# Patient Record
Sex: MALE | Race: WHITE | Employment: OTHER | URBAN - METROPOLITAN AREA
[De-identification: names, ages, dates, MRNs, and addresses within clinical notes are randomized per-mention and may not be internally consistent; named-entity substitution may affect disease eponyms.]

---

## 2019-02-11 ENCOUNTER — HOSPITAL ENCOUNTER (OUTPATIENT)
Age: 39
Discharge: HOME | End: 2019-02-11
Payer: COMMERCIAL

## 2019-02-11 LAB
ALBUMIN SERPL-MCNC: 3.9 G/DL (ref 3.5–5.2)
ALP BLD-CCNC: 76 U/L (ref 40–129)
ALT SERPL-CCNC: 13 U/L (ref 0–40)
ANION GAP SERPL CALCULATED.3IONS-SCNC: 9 MMOL/L (ref 7–16)
AST SERPL-CCNC: 20 U/L (ref 0–39)
BASOPHILS ABSOLUTE: 0.05 E9/L (ref 0–0.2)
BASOPHILS RELATIVE PERCENT: 0.8 % (ref 0–2)
BILIRUB SERPL-MCNC: <0.2 MG/DL (ref 0–1.2)
BUN BLDV-MCNC: 17 MG/DL (ref 6–20)
CALCIUM SERPL-MCNC: 9.3 MG/DL (ref 8.6–10.2)
CHLORIDE BLD-SCNC: 103 MMOL/L (ref 98–107)
CO2: 30 MMOL/L (ref 22–29)
CREAT SERPL-MCNC: 0.8 MG/DL (ref 0.7–1.2)
EOSINOPHILS ABSOLUTE: 0.15 E9/L (ref 0.05–0.5)
EOSINOPHILS RELATIVE PERCENT: 2.4 % (ref 0–6)
GFR AFRICAN AMERICAN: >60
GFR NON-AFRICAN AMERICAN: >60 ML/MIN/1.73
GLUCOSE FASTING: 98 MG/DL (ref 74–99)
HCT VFR BLD CALC: 38.8 % (ref 37–54)
HEMOGLOBIN: 13 G/DL (ref 12.5–16.5)
IMMATURE GRANULOCYTES #: 0.02 E9/L
IMMATURE GRANULOCYTES %: 0.3 % (ref 0–5)
LYMPHOCYTES ABSOLUTE: 2.3 E9/L (ref 1.5–4)
LYMPHOCYTES RELATIVE PERCENT: 36.5 % (ref 20–42)
MCH RBC QN AUTO: 30 PG (ref 26–35)
MCHC RBC AUTO-ENTMCNC: 33.5 % (ref 32–34.5)
MCV RBC AUTO: 89.4 FL (ref 80–99.9)
MONOCYTES ABSOLUTE: 0.32 E9/L (ref 0.1–0.95)
MONOCYTES RELATIVE PERCENT: 5.1 % (ref 2–12)
NEUTROPHILS ABSOLUTE: 3.46 E9/L (ref 1.8–7.3)
NEUTROPHILS RELATIVE PERCENT: 54.9 % (ref 43–80)
PDW BLD-RTO: 12.9 FL (ref 11.5–15)
PLATELET # BLD: 240 E9/L (ref 130–450)
PMV BLD AUTO: 9.9 FL (ref 7–12)
POTASSIUM SERPL-SCNC: 4.4 MMOL/L (ref 3.5–5)
RBC # BLD: 4.34 E12/L (ref 3.8–5.8)
SODIUM BLD-SCNC: 142 MMOL/L (ref 132–146)
TOTAL PROTEIN: 6.7 G/DL (ref 6.4–8.3)
WBC # BLD: 6.3 E9/L (ref 4.5–11.5)

## 2019-02-11 PROCEDURE — 80053 COMPREHEN METABOLIC PANEL: CPT

## 2019-02-11 PROCEDURE — 86592 SYPHILIS TEST NON-TREP QUAL: CPT

## 2019-02-11 PROCEDURE — 86703 HIV-1/HIV-2 1 RESULT ANTBDY: CPT

## 2019-02-11 PROCEDURE — 80074 ACUTE HEPATITIS PANEL: CPT

## 2019-02-11 PROCEDURE — 85025 COMPLETE CBC W/AUTO DIFF WBC: CPT

## 2019-02-11 PROCEDURE — 36415 COLL VENOUS BLD VENIPUNCTURE: CPT

## 2019-02-12 LAB
HIV-1 AND HIV-2 ANTIBODIES: NORMAL
RPR: NORMAL

## 2019-02-14 LAB
HAV IGM SER IA-ACNC: ABNORMAL
HEPATITIS B CORE IGM ANTIBODY: ABNORMAL
HEPATITIS B SURFACE ANTIGEN INTERPRETATION: ABNORMAL
HEPATITIS C ANTIBODY INTERPRETATION: REACTIVE

## 2020-12-26 ENCOUNTER — HOSPITAL ENCOUNTER (EMERGENCY)
Age: 40
Discharge: HOME | End: 2020-12-26
Payer: COMMERCIAL

## 2020-12-26 VITALS
BODY MASS INDEX: 21.22 KG/M2 | SYSTOLIC BLOOD PRESSURE: 134 MMHG | HEART RATE: 108 BPM | RESPIRATION RATE: 18 BRPM | WEIGHT: 140 LBS | HEIGHT: 68 IN | TEMPERATURE: 98.5 F | DIASTOLIC BLOOD PRESSURE: 84 MMHG | OXYGEN SATURATION: 99 %

## 2020-12-26 PROCEDURE — 99284 EMERGENCY DEPT VISIT MOD MDM: CPT

## 2020-12-26 RX ORDER — IBUPROFEN 600 MG/1
600 TABLET ORAL EVERY 6 HOURS PRN
Qty: 20 TABLET | Refills: 0 | Status: SHIPPED | OUTPATIENT
Start: 2020-12-26 | End: 2020-12-31

## 2020-12-26 RX ORDER — PENICILLIN V POTASSIUM 500 MG/1
500 TABLET ORAL 4 TIMES DAILY
Qty: 40 TABLET | Refills: 0 | Status: SHIPPED | OUTPATIENT
Start: 2020-12-26 | End: 2021-01-05

## 2020-12-26 RX ORDER — LIDOCAINE HYDROCHLORIDE 20 MG/ML
5 SOLUTION OROPHARYNGEAL 3 TIMES DAILY PRN
Qty: 75 ML | Refills: 0 | Status: SHIPPED | OUTPATIENT
Start: 2020-12-26

## 2020-12-26 ASSESSMENT — PAIN DESCRIPTION - ORIENTATION: ORIENTATION: RIGHT;LEFT

## 2020-12-26 ASSESSMENT — PAIN DESCRIPTION - LOCATION: LOCATION: MOUTH

## 2020-12-26 ASSESSMENT — PAIN DESCRIPTION - PAIN TYPE: TYPE: ACUTE PAIN

## 2020-12-26 ASSESSMENT — PAIN SCALES - GENERAL: PAINLEVEL_OUTOF10: 10

## 2020-12-26 ASSESSMENT — PAIN DESCRIPTION - DESCRIPTORS: DESCRIPTORS: ACHING;CONSTANT;DISCOMFORT

## 2020-12-26 NOTE — ED NOTES
Patient upset that \" I am not getting any pain medication\" Rn informed patient no orders were placed and that this is a prescription and Eliana ARCINIEGA is aware. Patient stormed off.      Marino Dickerson RN  12/26/20 9252

## 2020-12-26 NOTE — ED PROVIDER NOTES
[facility]   ED  Encounter Note  Admit Date/RoomTime: 2020  2:45 PM  ED Room:     NAME: Kai Araujo  : 1980  MRN: [id number]     Chief Complaint:  Dental Pain (10/10. started 2 days ago. getting worst. feels face is swollen. )    History of Present Illness        Kai Araujo is a 41 y.o. old male who presents to the emergency department with a complaint of dental pain. Patient notes he is very poor dentition overall. Admits he just had a infection a couple months ago. Pain started again about 4 days ago. Mostly to left lower jaw. Woke up this morning and he had some swelling along the jawline. Taking Motrin at home with no relief. Applying Orajel without relief. Eating makes it worse. Onset:       Spontaneous:   no.     Following Trauma:   no.     Previous Caries:   yes. Recent Dental Procedure:   no.     ROS   Pertinent positives and negatives are stated within HPI, all other systems reviewed and are negative. Past Medical History:  has a past medical history of Drug abuse ([address] Hepatitis C, and Scoliosis. Surgical History:  has a past surgical history that includes fasciotomy (2012). Social History:  reports that he has been smoking. He has a 17.00 pack-year smoking history. He has never used smokeless tobacco. He reports current drug use. Drugs: IV and Marijuana. He reports that he does not drink alcohol. Family History: family history is not on file. Allergies: Patient has no known allergies. Physical Exam   Oxygen Saturation Interpretation: Normal.        ED Triage Vitals [20 1447]   BP Temp Temp src Pulse Resp SpO2 Height Weight   134/84 -- -- 108 18 99 % 5' 8\" (1.727 m) 140 lb (63.5 kg)              General:  NAD. Alert and Oriented. Well-appearing. Skin:  Warm, dry. No rashes. Head:  Normocephalic. Atraumatic. Eyes:  EOMI.   Conjunctiva normal.  ENT:  Oral mucosa moist.  Airway patent. Posterior pharynx without erythema, without swelling, without exudate. With white film all over the tongue. Horrible dentition-teeth are carious, brown, grayish in color. Several are eroded down to the gumline. Area of pain is to the left lower jaw along teeth 22 through 19. No visible dental abscess. Neck:  Supple. Normal ROM. No tender lymphadenopathy. Respiratory:  No respiratory distress. No labored breathing. Lungs clear without rales, rhonchi or wheezing. Cardiovascular:  Regular rate. No peripheral edema. Extremities warm and good color. Extremities:  Normal ROM. Nontender to palpation. Atraumatic. Back:  Normal ROM. Nontender to palpation. Neuro:  Alert and Oriented to person, place, time and situation. Normal LOC. Moves all extremities. Speech fluent. Psych:  Calm and Cooperative. Normal thought process. Normal judgement. Lab / Imaging Results   (All laboratory and radiology results have been personally reviewed by myself)  Labs:  No results found for this visit on 12/26/20. Imaging: All Radiology results interpreted by Radiologist unless otherwise noted. No orders to display     ED Course / Medical Decision Making   Medications - No data to display     Consult(s):   Dental Resident was not consulted to see patient regarding complaint. Procedure(s):    none. Counseling/MDM:    I discussed todays visit summary with patient,  in addition to providing specific details for the plan of care and counseling regarding the diagnosis and prognosis. Questions are answered at this time and they are agreeable with the plan. Assessment      1. Pain, dental    2. Dental caries      Plan   Discharge to home.   Patient condition is good    New Medications     New Prescriptions    IBUPROFEN (IBU) 600 MG TABLET    Take 1 tablet by mouth every 6 hours as needed for Pain    LIDOCAINE VISCOUS HCL (XYLOCAINE) 2 % SOLN SOLUTION    Take 5 mLs by mouth 3 times daily as needed for Irritation Soak 5 mL's of viscous lidocaine with a cotton ball. Tuck the cotton ball along the side of the gums next to area of dental pain. PENICILLIN V POTASSIUM (VEETID) 500 MG TABLET    Take 1 tablet by mouth 4 times daily for 10 days     Electronically signed by SUZE Aburto   DD: 12/26/20  **This report was transcribed using voice recognition software. Every effort was made to ensure accuracy; however, inadvertent computerized transcription errors may be present.   END OF ED PROVIDER NOTE       Sharonda Aburto  12/26/20 [id number]

## 2023-10-13 ENCOUNTER — APPOINTMENT (OUTPATIENT)
Dept: ULTRASOUND IMAGING | Age: 43
End: 2023-10-13
Payer: COMMERCIAL

## 2023-10-13 ENCOUNTER — APPOINTMENT (OUTPATIENT)
Dept: GENERAL RADIOLOGY | Age: 43
End: 2023-10-13
Payer: COMMERCIAL

## 2023-10-13 ENCOUNTER — HOSPITAL ENCOUNTER (EMERGENCY)
Age: 43
Discharge: HOME | End: 2023-10-14
Attending: STUDENT IN AN ORGANIZED HEALTH CARE EDUCATION/TRAINING PROGRAM
Payer: COMMERCIAL

## 2023-10-13 DIAGNOSIS — L03.115 CELLULITIS OF RIGHT LOWER EXTREMITY: Primary | ICD-10-CM

## 2023-10-13 LAB
ALBUMIN SERPL-MCNC: 3.5 G/DL (ref 3.5–5.2)
ALP SERPL-CCNC: 114 U/L (ref 40–129)
ALT SERPL-CCNC: 27 U/L (ref 0–40)
ANION GAP SERPL CALCULATED.3IONS-SCNC: 6 MMOL/L (ref 7–16)
AST SERPL-CCNC: 23 U/L (ref 0–39)
BASOPHILS # BLD: 0.02 K/UL (ref 0–0.2)
BASOPHILS NFR BLD: 0 % (ref 0–2)
BILIRUB SERPL-MCNC: 0.2 MG/DL (ref 0–1.2)
BUN SERPL-MCNC: 17 MG/DL (ref 6–20)
CALCIUM SERPL-MCNC: 9.2 MG/DL (ref 8.6–10.2)
CHLORIDE SERPL-SCNC: 98 MMOL/L (ref 98–107)
CO2 SERPL-SCNC: 30 MMOL/L (ref 22–29)
CREAT SERPL-MCNC: 0.7 MG/DL (ref 0.7–1.2)
CRP SERPL HS-MCNC: 3 MG/L (ref 0–5)
EOSINOPHIL # BLD: 0.09 K/UL (ref 0.05–0.5)
EOSINOPHILS RELATIVE PERCENT: 1 % (ref 0–6)
ERYTHROCYTE [DISTWIDTH] IN BLOOD BY AUTOMATED COUNT: 13.7 % (ref 11.5–15)
ERYTHROCYTE [SEDIMENTATION RATE] IN BLOOD BY WESTERGREN METHOD: 29 MM/HR (ref 0–15)
GFR SERPL CREATININE-BSD FRML MDRD: >60 ML/MIN/1.73M2
GLUCOSE SERPL-MCNC: 89 MG/DL (ref 74–99)
HCT VFR BLD AUTO: 37.2 % (ref 37–54)
HGB BLD-MCNC: 12.2 G/DL (ref 12.5–16.5)
IMM GRANULOCYTES # BLD AUTO: 0.03 K/UL (ref 0–0.58)
IMM GRANULOCYTES NFR BLD: 0 % (ref 0–5)
LACTATE BLDV-SCNC: 0.8 MMOL/L (ref 0.5–1.9)
LYMPHOCYTES NFR BLD: 2.11 K/UL (ref 1.5–4)
LYMPHOCYTES RELATIVE PERCENT: 27 % (ref 20–42)
MCH RBC QN AUTO: 30.8 PG (ref 26–35)
MCHC RBC AUTO-ENTMCNC: 32.8 G/DL (ref 32–34.5)
MCV RBC AUTO: 93.9 FL (ref 80–99.9)
MONOCYTES NFR BLD: 0.5 K/UL (ref 0.1–0.95)
MONOCYTES NFR BLD: 6 % (ref 2–12)
NEUTROPHILS NFR BLD: 65 % (ref 43–80)
NEUTS SEG NFR BLD: 5.12 K/UL (ref 1.8–7.3)
PLATELET # BLD AUTO: 218 K/UL (ref 130–450)
PMV BLD AUTO: 9.6 FL (ref 7–12)
POTASSIUM SERPL-SCNC: 4.3 MMOL/L (ref 3.5–5)
PROT SERPL-MCNC: 6.5 G/DL (ref 6.4–8.3)
RBC # BLD AUTO: 3.96 M/UL (ref 3.8–5.8)
SODIUM SERPL-SCNC: 134 MMOL/L (ref 132–146)
WBC OTHER # BLD: 7.9 K/UL (ref 4.5–11.5)

## 2023-10-13 PROCEDURE — 73552 X-RAY EXAM OF FEMUR 2/>: CPT

## 2023-10-13 PROCEDURE — 83605 ASSAY OF LACTIC ACID: CPT

## 2023-10-13 PROCEDURE — 87040 BLOOD CULTURE FOR BACTERIA: CPT

## 2023-10-13 PROCEDURE — 85652 RBC SED RATE AUTOMATED: CPT

## 2023-10-13 PROCEDURE — 86140 C-REACTIVE PROTEIN: CPT

## 2023-10-13 PROCEDURE — 99284 EMERGENCY DEPT VISIT MOD MDM: CPT

## 2023-10-13 PROCEDURE — 90471 IMMUNIZATION ADMIN: CPT | Performed by: STUDENT IN AN ORGANIZED HEALTH CARE EDUCATION/TRAINING PROGRAM

## 2023-10-13 PROCEDURE — 93971 EXTREMITY STUDY: CPT

## 2023-10-13 PROCEDURE — 6370000000 HC RX 637 (ALT 250 FOR IP): Performed by: STUDENT IN AN ORGANIZED HEALTH CARE EDUCATION/TRAINING PROGRAM

## 2023-10-13 PROCEDURE — 80053 COMPREHEN METABOLIC PANEL: CPT

## 2023-10-13 PROCEDURE — 90714 TD VACC NO PRESV 7 YRS+ IM: CPT | Performed by: STUDENT IN AN ORGANIZED HEALTH CARE EDUCATION/TRAINING PROGRAM

## 2023-10-13 PROCEDURE — 85025 COMPLETE CBC W/AUTO DIFF WBC: CPT

## 2023-10-13 PROCEDURE — 6360000002 HC RX W HCPCS: Performed by: STUDENT IN AN ORGANIZED HEALTH CARE EDUCATION/TRAINING PROGRAM

## 2023-10-13 RX ORDER — CEPHALEXIN 500 MG/1
500 CAPSULE ORAL ONCE
Status: COMPLETED | OUTPATIENT
Start: 2023-10-13 | End: 2023-10-13

## 2023-10-13 RX ORDER — CEPHALEXIN 500 MG/1
500 CAPSULE ORAL 4 TIMES DAILY
Qty: 40 CAPSULE | Refills: 0 | Status: SHIPPED | OUTPATIENT
Start: 2023-10-13 | End: 2023-10-23

## 2023-10-13 RX ORDER — DOXYCYCLINE HYCLATE 100 MG/1
100 CAPSULE ORAL ONCE
Status: COMPLETED | OUTPATIENT
Start: 2023-10-13 | End: 2023-10-13

## 2023-10-13 RX ORDER — DOXYCYCLINE HYCLATE 100 MG
100 TABLET ORAL 2 TIMES DAILY
Qty: 20 TABLET | Refills: 0 | Status: SHIPPED | OUTPATIENT
Start: 2023-10-13 | End: 2023-10-23

## 2023-10-13 RX ORDER — CEPHALEXIN 500 MG/1
500 CAPSULE ORAL 4 TIMES DAILY
Qty: 40 CAPSULE | Refills: 0 | Status: SHIPPED | OUTPATIENT
Start: 2023-10-13 | End: 2023-10-13 | Stop reason: SDUPTHER

## 2023-10-13 RX ORDER — DOXYCYCLINE HYCLATE 100 MG
100 TABLET ORAL 2 TIMES DAILY
Qty: 20 TABLET | Refills: 0 | Status: SHIPPED | OUTPATIENT
Start: 2023-10-13 | End: 2023-10-13 | Stop reason: SDUPTHER

## 2023-10-13 RX ORDER — TETANUS AND DIPHTHERIA TOXOIDS ADSORBED 2; 2 [LF]/.5ML; [LF]/.5ML
0.5 INJECTION INTRAMUSCULAR ONCE
Status: COMPLETED | OUTPATIENT
Start: 2023-10-13 | End: 2023-10-13

## 2023-10-13 RX ADMIN — CEPHALEXIN 500 MG: 500 CAPSULE ORAL at 21:41

## 2023-10-13 RX ADMIN — DOXYCYCLINE 100 MG: 100 CAPSULE ORAL at 21:41

## 2023-10-13 RX ADMIN — TETANUS AND DIPHTHERIA TOXOIDS ADSORBED 0.5 ML: 2; 2 INJECTION INTRAMUSCULAR at 21:42

## 2023-10-13 ASSESSMENT — PATIENT HEALTH QUESTIONNAIRE - PHQ9
SUM OF ALL RESPONSES TO PHQ QUESTIONS 1-9: 0
SUM OF ALL RESPONSES TO PHQ9 QUESTIONS 1 & 2: 0
2. FEELING DOWN, DEPRESSED OR HOPELESS: 0
SUM OF ALL RESPONSES TO PHQ QUESTIONS 1-9: 0
1. LITTLE INTEREST OR PLEASURE IN DOING THINGS: 0
SUM OF ALL RESPONSES TO PHQ QUESTIONS 1-9: 0
SUM OF ALL RESPONSES TO PHQ QUESTIONS 1-9: 0

## 2023-10-13 ASSESSMENT — PAIN SCALES - GENERAL: PAINLEVEL_OUTOF10: 6

## 2023-10-13 ASSESSMENT — ENCOUNTER SYMPTOMS
COUGH: 0
EYE DISCHARGE: 0
EYE REDNESS: 0
SHORTNESS OF BREATH: 0
BACK PAIN: 0
EYE PAIN: 0
NAUSEA: 0
VOMITING: 0
SINUS PRESSURE: 0
ABDOMINAL PAIN: 0
WHEEZING: 0
DIARRHEA: 0
SORE THROAT: 0

## 2023-10-13 ASSESSMENT — PAIN DESCRIPTION - LOCATION: LOCATION: OTHER (COMMENT)

## 2023-10-13 ASSESSMENT — PAIN - FUNCTIONAL ASSESSMENT: PAIN_FUNCTIONAL_ASSESSMENT: 0-10

## 2023-10-13 ASSESSMENT — PAIN DESCRIPTION - DESCRIPTORS: DESCRIPTORS: SORE;BURNING

## 2023-10-13 ASSESSMENT — LIFESTYLE VARIABLES: HOW OFTEN DO YOU HAVE A DRINK CONTAINING ALCOHOL: NEVER

## 2023-10-13 ASSESSMENT — PAIN DESCRIPTION - PAIN TYPE: TYPE: ACUTE PAIN

## 2023-10-13 ASSESSMENT — PAIN DESCRIPTION - ORIENTATION: ORIENTATION: RIGHT

## 2023-10-14 VITALS
SYSTOLIC BLOOD PRESSURE: 127 MMHG | DIASTOLIC BLOOD PRESSURE: 74 MMHG | TEMPERATURE: 98 F | OXYGEN SATURATION: 97 % | RESPIRATION RATE: 17 BRPM | HEART RATE: 69 BPM | HEIGHT: 68 IN | WEIGHT: 178 LBS | BODY MASS INDEX: 26.98 KG/M2

## 2023-10-14 NOTE — DISCHARGE INSTRUCTIONS
Trial antibiotics for 48 hours, if symptoms are worsening if you develop fevers if redness and swelling and pain are worsened come back to emergency department to be reevaluated.

## 2023-10-15 LAB
MICROORGANISM SPEC CULT: NORMAL
MICROORGANISM SPEC CULT: NORMAL
SERVICE CMNT-IMP: NORMAL
SERVICE CMNT-IMP: NORMAL
SPECIMEN DESCRIPTION: NORMAL
SPECIMEN DESCRIPTION: NORMAL

## 2024-05-29 ENCOUNTER — HOSPITAL ENCOUNTER (EMERGENCY)
Age: 44
Discharge: HOME | End: 2024-05-29
Payer: COMMERCIAL

## 2024-05-29 VITALS
DIASTOLIC BLOOD PRESSURE: 72 MMHG | SYSTOLIC BLOOD PRESSURE: 112 MMHG | HEART RATE: 69 BPM | RESPIRATION RATE: 18 BRPM | TEMPERATURE: 98.1 F | OXYGEN SATURATION: 98 %

## 2024-05-29 DIAGNOSIS — S81.812A SKIN TEAR OF LEFT LOWER LEG WITHOUT COMPLICATION, INITIAL ENCOUNTER: Primary | ICD-10-CM

## 2024-05-29 PROCEDURE — 6370000000 HC RX 637 (ALT 250 FOR IP)

## 2024-05-29 PROCEDURE — 99284 EMERGENCY DEPT VISIT MOD MDM: CPT

## 2024-05-29 RX ORDER — HYDROCODONE BITARTRATE AND ACETAMINOPHEN 5; 325 MG/1; MG/1
1 TABLET ORAL ONCE
Status: COMPLETED | OUTPATIENT
Start: 2024-05-29 | End: 2024-05-29

## 2024-05-29 RX ORDER — CEPHALEXIN 500 MG/1
500 CAPSULE ORAL 4 TIMES DAILY
Qty: 28 CAPSULE | Refills: 0 | Status: SHIPPED | OUTPATIENT
Start: 2024-05-29 | End: 2024-06-05

## 2024-05-29 RX ADMIN — HYDROCODONE BITARTRATE AND ACETAMINOPHEN 1 TABLET: 5; 325 TABLET ORAL at 12:19

## 2024-05-29 ASSESSMENT — ENCOUNTER SYMPTOMS
GASTROINTESTINAL NEGATIVE: 1
RESPIRATORY NEGATIVE: 1
EYES NEGATIVE: 1
ALLERGIC/IMMUNOLOGIC NEGATIVE: 1

## 2024-05-29 ASSESSMENT — PAIN DESCRIPTION - ORIENTATION: ORIENTATION: LEFT

## 2024-05-29 ASSESSMENT — PAIN DESCRIPTION - LOCATION: LOCATION: LEG

## 2024-05-29 NOTE — ED NOTES
Spoke to Haydee from [facility] and gave update on pt plan of care and they had no ride to send and asked us to set up transport for pt back to facility

## 2024-05-29 NOTE — ED PROVIDER NOTES
anesthetized with not required.  WOUND COMPLEXITY:    Debridement: None.  Undermining: None.  Wound Margins Revised: None.  Flaps Aligned: yes.  The wound was explored with the following results no foreign body or tendon injury seen.  The wound was closed with Steri-Strips.  Dressing:  a sterile dressing was placed.    Total number suture and steri-strips: 4     CRITICAL CARE TIME (.cctime)   None    PAST MEDICAL HISTORY      has a past medical history of Drug abuse (HCC), Hepatitis C, and Scoliosis.     Chronic Conditions affecting Care: Left lower extremity above-the-knee nutation    EMERGENCY DEPARTMENT COURSE and DIFFERENTIAL DIAGNOSIS/MDM:   Vitals:    Vitals:    05/29/24 1215   BP: 100/70   Pulse: 67   Resp: 16   Temp: 98.1 °F (36.7 °C)   TempSrc: Oral   SpO2: 98%       Patient was given the following medications:  Medications   HYDROcodone-acetaminophen (NORCO) 5-325 MG per tablet 1 tablet (1 tablet Oral Given 5/29/24 1219)       CONSULTS: (Who and What was discussed)  None  Discussion with Other Profesionals : None    Social Determinants : Currently resides at [facility]    Records Reviewed : Other previous medical history    CC/HPI Summary, DDx, ED Course, and Reassessment: Praveen Courtney is a 43 y.o. male who presents to the ED by ambulance for a wound check after a fall.  Patient states he fell forward out of his wheelchair last night causing a wound to the medial aspect of the left upper leg.  Patient states that a friend of his was falling out of bed and he attempted to bend forward to catch the patient causing him to fall forward onto the floor.  Patient denies any head injury, loss of consciousness, or use of blood thinners.  Patient states he cleaned the area last night with alcohol.  Patient's tetanus shot is up-to-date.  Patient has no other complaints at this time.  Wound cleansed and closed with Steri-Strips as noted above in procedure note.  Sterile dressing applied by nursing staff.

## 2025-02-25 ENCOUNTER — HOSPITAL ENCOUNTER (OUTPATIENT)
Age: 45
Setting detail: OBSERVATION
LOS: 1 days | Discharge: HOME | End: 2025-02-27
Attending: STUDENT IN AN ORGANIZED HEALTH CARE EDUCATION/TRAINING PROGRAM | Admitting: INTERNAL MEDICINE
Payer: COMMERCIAL

## 2025-02-25 ENCOUNTER — APPOINTMENT (OUTPATIENT)
Dept: GENERAL RADIOLOGY | Age: 45
End: 2025-02-25
Payer: COMMERCIAL

## 2025-02-25 DIAGNOSIS — S72.142A CLOSED INTERTROCHANTERIC FRACTURE OF LEFT FEMUR, INITIAL ENCOUNTER (HCC): ICD-10-CM

## 2025-02-25 DIAGNOSIS — S72.002A FRACTURE OF PROXIMAL END OF LEFT FEMUR, CLOSED, INITIAL ENCOUNTER (HCC): ICD-10-CM

## 2025-02-25 DIAGNOSIS — S72.352A CLOSED DISPLACED COMMINUTED FRACTURE OF SHAFT OF LEFT FEMUR, INITIAL ENCOUNTER (HCC): Primary | ICD-10-CM

## 2025-02-25 PROCEDURE — 6370000000 HC RX 637 (ALT 250 FOR IP): Performed by: STUDENT IN AN ORGANIZED HEALTH CARE EDUCATION/TRAINING PROGRAM

## 2025-02-25 PROCEDURE — 73560 X-RAY EXAM OF KNEE 1 OR 2: CPT

## 2025-02-25 PROCEDURE — 99285 EMERGENCY DEPT VISIT HI MDM: CPT

## 2025-02-25 RX ORDER — OXYCODONE AND ACETAMINOPHEN 5; 325 MG/1; MG/1
1 TABLET ORAL ONCE
Status: COMPLETED | OUTPATIENT
Start: 2025-02-25 | End: 2025-02-25

## 2025-02-25 RX ORDER — ACETAMINOPHEN 500 MG
1000 TABLET ORAL ONCE
Status: DISCONTINUED | OUTPATIENT
Start: 2025-02-25 | End: 2025-02-25

## 2025-02-25 RX ADMIN — OXYCODONE AND ACETAMINOPHEN 1 TABLET: 5; 325 TABLET ORAL at 23:28

## 2025-02-25 ASSESSMENT — PAIN - FUNCTIONAL ASSESSMENT: PAIN_FUNCTIONAL_ASSESSMENT: 0-10

## 2025-02-25 ASSESSMENT — PAIN SCALES - GENERAL: PAINLEVEL_OUTOF10: 10

## 2025-02-25 ASSESSMENT — LIFESTYLE VARIABLES
HOW OFTEN DO YOU HAVE A DRINK CONTAINING ALCOHOL: NEVER
HOW MANY STANDARD DRINKS CONTAINING ALCOHOL DO YOU HAVE ON A TYPICAL DAY: PATIENT DOES NOT DRINK

## 2025-02-26 ENCOUNTER — APPOINTMENT (OUTPATIENT)
Dept: CT IMAGING | Age: 45
End: 2025-02-26
Payer: COMMERCIAL

## 2025-02-26 ENCOUNTER — APPOINTMENT (OUTPATIENT)
Dept: GENERAL RADIOLOGY | Age: 45
End: 2025-02-26
Payer: COMMERCIAL

## 2025-02-26 ENCOUNTER — ANESTHESIA EVENT (OUTPATIENT)
Dept: OPERATING ROOM | Age: 45
End: 2025-02-26
Payer: COMMERCIAL

## 2025-02-26 ENCOUNTER — ANESTHESIA (OUTPATIENT)
Dept: OPERATING ROOM | Age: 45
End: 2025-02-26
Payer: COMMERCIAL

## 2025-02-26 PROBLEM — R76.8 HEPATITIS C ANTIBODY POSITIVE IN BLOOD: Status: ACTIVE | Noted: 2025-02-26

## 2025-02-26 PROBLEM — S72.142A CLOSED INTERTROCHANTERIC FRACTURE OF LEFT FEMUR, INITIAL ENCOUNTER (HCC): Status: ACTIVE | Noted: 2025-02-26

## 2025-02-26 PROBLEM — S72.352A CLOSED DISPLACED COMMINUTED FRACTURE OF SHAFT OF LEFT FEMUR (HCC): Status: ACTIVE | Noted: 2025-02-26

## 2025-02-26 PROBLEM — S72.002A: Status: ACTIVE | Noted: 2025-02-26

## 2025-02-26 PROBLEM — F11.20 METHADONE MAINTENANCE THERAPY PATIENT (HCC): Status: ACTIVE | Noted: 2025-02-26

## 2025-02-26 LAB
ABO + RH BLD: NORMAL
ALBUMIN SERPL-MCNC: 4.4 G/DL (ref 3.5–5.2)
ALP SERPL-CCNC: 97 U/L (ref 40–129)
ALT SERPL-CCNC: 14 U/L (ref 0–40)
ANION GAP SERPL CALCULATED.3IONS-SCNC: 10 MMOL/L (ref 7–16)
ARM BAND NUMBER: NORMAL
AST SERPL-CCNC: 26 U/L (ref 0–39)
BASOPHILS # BLD: 0.04 K/UL (ref 0–0.2)
BASOPHILS NFR BLD: 0 % (ref 0–2)
BILIRUB SERPL-MCNC: 0.4 MG/DL (ref 0–1.2)
BLOOD BANK SAMPLE EXPIRATION: NORMAL
BLOOD GROUP ANTIBODIES SERPL: NEGATIVE
BUN SERPL-MCNC: 16 MG/DL (ref 6–20)
CALCIUM SERPL-MCNC: 9.7 MG/DL (ref 8.6–10.2)
CHLORIDE SERPL-SCNC: 102 MMOL/L (ref 98–107)
CO2 SERPL-SCNC: 26 MMOL/L (ref 22–29)
CREAT SERPL-MCNC: 0.8 MG/DL (ref 0.7–1.2)
EKG ATRIAL RATE: 72 BPM
EKG P AXIS: 102 DEGREES
EKG P-R INTERVAL: 154 MS
EKG Q-T INTERVAL: 448 MS
EKG QRS DURATION: 94 MS
EKG QTC CALCULATION (BAZETT): 490 MS
EKG R AXIS: 78 DEGREES
EKG T AXIS: 71 DEGREES
EKG VENTRICULAR RATE: 72 BPM
EOSINOPHIL # BLD: 0.08 K/UL (ref 0.05–0.5)
EOSINOPHILS RELATIVE PERCENT: 1 % (ref 0–6)
ERYTHROCYTE [DISTWIDTH] IN BLOOD BY AUTOMATED COUNT: 12.8 % (ref 11.5–15)
GFR, ESTIMATED: >90 ML/MIN/1.73M2
GLUCOSE SERPL-MCNC: 82 MG/DL (ref 74–99)
HCT VFR BLD AUTO: 41.8 % (ref 37–54)
HGB BLD-MCNC: 14.4 G/DL (ref 12.5–16.5)
IMM GRANULOCYTES # BLD AUTO: <0.03 K/UL (ref 0–0.58)
IMM GRANULOCYTES NFR BLD: 0 % (ref 0–5)
LYMPHOCYTES NFR BLD: 2.79 K/UL (ref 1.5–4)
LYMPHOCYTES RELATIVE PERCENT: 28 % (ref 20–42)
MCH RBC QN AUTO: 30.5 PG (ref 26–35)
MCHC RBC AUTO-ENTMCNC: 34.4 G/DL (ref 32–34.5)
MCV RBC AUTO: 88.6 FL (ref 80–99.9)
MONOCYTES NFR BLD: 0.5 K/UL (ref 0.1–0.95)
MONOCYTES NFR BLD: 5 % (ref 2–12)
NEUTROPHILS NFR BLD: 65 % (ref 43–80)
NEUTS SEG NFR BLD: 6.42 K/UL (ref 1.8–7.3)
PLATELET # BLD AUTO: 222 K/UL (ref 130–450)
PMV BLD AUTO: 9.8 FL (ref 7–12)
POTASSIUM SERPL-SCNC: 3.7 MMOL/L (ref 3.5–5)
PROT SERPL-MCNC: 7.6 G/DL (ref 6.4–8.3)
RBC # BLD AUTO: 4.72 M/UL (ref 3.8–5.8)
SODIUM SERPL-SCNC: 138 MMOL/L (ref 132–146)
WBC OTHER # BLD: 9.9 K/UL (ref 4.5–11.5)

## 2025-02-26 PROCEDURE — 6370000000 HC RX 637 (ALT 250 FOR IP): Performed by: FAMILY MEDICINE

## 2025-02-26 PROCEDURE — 3700000000 HC ANESTHESIA ATTENDED CARE: Performed by: ORTHOPAEDIC SURGERY

## 2025-02-26 PROCEDURE — 2580000003 HC RX 258: Performed by: INTERNAL MEDICINE

## 2025-02-26 PROCEDURE — 3700000001 HC ADD 15 MINUTES (ANESTHESIA): Performed by: ORTHOPAEDIC SURGERY

## 2025-02-26 PROCEDURE — 86850 RBC ANTIBODY SCREEN: CPT

## 2025-02-26 PROCEDURE — 86900 BLOOD TYPING SEROLOGIC ABO: CPT

## 2025-02-26 PROCEDURE — 76937 US GUIDE VASCULAR ACCESS: CPT

## 2025-02-26 PROCEDURE — G0378 HOSPITAL OBSERVATION PER HR: HCPCS

## 2025-02-26 PROCEDURE — 7100000000 HC PACU RECOVERY - FIRST 15 MIN: Performed by: ORTHOPAEDIC SURGERY

## 2025-02-26 PROCEDURE — C1769 GUIDE WIRE: HCPCS | Performed by: ORTHOPAEDIC SURGERY

## 2025-02-26 PROCEDURE — 6360000002 HC RX W HCPCS: Performed by: FAMILY MEDICINE

## 2025-02-26 PROCEDURE — 3600000005 HC SURGERY LEVEL 5 BASE: Performed by: ORTHOPAEDIC SURGERY

## 2025-02-26 PROCEDURE — 3600000015 HC SURGERY LEVEL 5 ADDTL 15MIN: Performed by: ORTHOPAEDIC SURGERY

## 2025-02-26 PROCEDURE — 99222 1ST HOSP IP/OBS MODERATE 55: CPT | Performed by: INTERNAL MEDICINE

## 2025-02-26 PROCEDURE — 2720000010 HC SURG SUPPLY STERILE: Performed by: ORTHOPAEDIC SURGERY

## 2025-02-26 PROCEDURE — 85025 COMPLETE CBC W/AUTO DIFF WBC: CPT

## 2025-02-26 PROCEDURE — 99231 SBSQ HOSP IP/OBS SF/LOW 25: CPT | Performed by: ORTHOPAEDIC SURGERY

## 2025-02-26 PROCEDURE — 6360000002 HC RX W HCPCS: Performed by: INTERNAL MEDICINE

## 2025-02-26 PROCEDURE — 6360000002 HC RX W HCPCS: Performed by: ANESTHESIOLOGY

## 2025-02-26 PROCEDURE — 73700 CT LOWER EXTREMITY W/O DYE: CPT

## 2025-02-26 PROCEDURE — 2580000003 HC RX 258

## 2025-02-26 PROCEDURE — 93010 ELECTROCARDIOGRAM REPORT: CPT | Performed by: INTERNAL MEDICINE

## 2025-02-26 PROCEDURE — 36415 COLL VENOUS BLD VENIPUNCTURE: CPT

## 2025-02-26 PROCEDURE — 2500000003 HC RX 250 WO HCPCS

## 2025-02-26 PROCEDURE — 86901 BLOOD TYPING SEROLOGIC RH(D): CPT

## 2025-02-26 PROCEDURE — 27513 TREATMENT OF THIGH FRACTURE: CPT | Performed by: ORTHOPAEDIC SURGERY

## 2025-02-26 PROCEDURE — 2709999900 HC NON-CHARGEABLE SUPPLY: Performed by: ORTHOPAEDIC SURGERY

## 2025-02-26 PROCEDURE — 93005 ELECTROCARDIOGRAM TRACING: CPT | Performed by: STUDENT IN AN ORGANIZED HEALTH CARE EDUCATION/TRAINING PROGRAM

## 2025-02-26 PROCEDURE — 80053 COMPREHEN METABOLIC PANEL: CPT

## 2025-02-26 PROCEDURE — 6360000002 HC RX W HCPCS

## 2025-02-26 PROCEDURE — C1713 ANCHOR/SCREW BN/BN,TIS/BN: HCPCS | Performed by: ORTHOPAEDIC SURGERY

## 2025-02-26 PROCEDURE — 7100000001 HC PACU RECOVERY - ADDTL 15 MIN: Performed by: ORTHOPAEDIC SURGERY

## 2025-02-26 DEVICE — LOCKING SCREW
Type: IMPLANTABLE DEVICE | Site: LEG | Status: FUNCTIONAL
Brand: T2 ALPHA

## 2025-02-26 DEVICE — ADVANCED LOCKING SCREW: Type: IMPLANTABLE DEVICE | Site: LEG | Status: FUNCTIONAL

## 2025-02-26 DEVICE — K-WIRE: Type: IMPLANTABLE DEVICE | Site: LEG | Status: FUNCTIONAL

## 2025-02-26 DEVICE — IMPLANTABLE DEVICE
Type: IMPLANTABLE DEVICE | Site: LEG | Status: FUNCTIONAL
Brand: T2

## 2025-02-26 RX ORDER — LABETALOL HYDROCHLORIDE 5 MG/ML
10 INJECTION, SOLUTION INTRAVENOUS
Status: DISCONTINUED | OUTPATIENT
Start: 2025-02-26 | End: 2025-02-26 | Stop reason: HOSPADM

## 2025-02-26 RX ORDER — DEXAMETHASONE SODIUM PHOSPHATE 10 MG/ML
INJECTION, SOLUTION INTRAMUSCULAR; INTRAVENOUS
Status: DISCONTINUED | OUTPATIENT
Start: 2025-02-26 | End: 2025-02-26 | Stop reason: SDUPTHER

## 2025-02-26 RX ORDER — KETOROLAC TROMETHAMINE 30 MG/ML
30 INJECTION, SOLUTION INTRAMUSCULAR; INTRAVENOUS ONCE
Status: DISCONTINUED | OUTPATIENT
Start: 2025-02-26 | End: 2025-02-26 | Stop reason: HOSPADM

## 2025-02-26 RX ORDER — SODIUM CHLORIDE 9 MG/ML
INJECTION, SOLUTION INTRAVENOUS
Status: DISCONTINUED | OUTPATIENT
Start: 2025-02-26 | End: 2025-02-26 | Stop reason: SDUPTHER

## 2025-02-26 RX ORDER — PROCHLORPERAZINE EDISYLATE 5 MG/ML
5 INJECTION INTRAMUSCULAR; INTRAVENOUS
Status: DISCONTINUED | OUTPATIENT
Start: 2025-02-26 | End: 2025-02-26 | Stop reason: HOSPADM

## 2025-02-26 RX ORDER — BUPIVACAINE HYDROCHLORIDE 7.5 MG/ML
INJECTION, SOLUTION INTRASPINAL
Status: COMPLETED | OUTPATIENT
Start: 2025-02-26 | End: 2025-02-26

## 2025-02-26 RX ORDER — GABAPENTIN 600 MG/1
600 TABLET ORAL 3 TIMES DAILY
COMMUNITY

## 2025-02-26 RX ORDER — SODIUM CHLORIDE 9 MG/ML
INJECTION, SOLUTION INTRAVENOUS PRN
Status: DISCONTINUED | OUTPATIENT
Start: 2025-02-26 | End: 2025-02-26 | Stop reason: HOSPADM

## 2025-02-26 RX ORDER — HALOPERIDOL 5 MG/ML
1 INJECTION INTRAMUSCULAR
Status: DISCONTINUED | OUTPATIENT
Start: 2025-02-26 | End: 2025-02-26 | Stop reason: HOSPADM

## 2025-02-26 RX ORDER — IPRATROPIUM BROMIDE AND ALBUTEROL SULFATE 2.5; .5 MG/3ML; MG/3ML
1 SOLUTION RESPIRATORY (INHALATION)
Status: DISCONTINUED | OUTPATIENT
Start: 2025-02-26 | End: 2025-02-26 | Stop reason: HOSPADM

## 2025-02-26 RX ORDER — ASPIRIN 81 MG/1
81 TABLET ORAL 2 TIMES DAILY
Qty: 90 TABLET | Refills: 0 | Status: SHIPPED | OUTPATIENT
Start: 2025-02-27

## 2025-02-26 RX ORDER — CEFAZOLIN SODIUM 1 G/3ML
INJECTION, POWDER, FOR SOLUTION INTRAMUSCULAR; INTRAVENOUS
Status: COMPLETED
Start: 2025-02-26 | End: 2025-02-26

## 2025-02-26 RX ORDER — SODIUM CHLORIDE 9 MG/ML
INJECTION, SOLUTION INTRAVENOUS CONTINUOUS
Status: DISCONTINUED | OUTPATIENT
Start: 2025-02-26 | End: 2025-02-27 | Stop reason: HOSPADM

## 2025-02-26 RX ORDER — NALOXONE HYDROCHLORIDE 0.4 MG/ML
INJECTION, SOLUTION INTRAMUSCULAR; INTRAVENOUS; SUBCUTANEOUS PRN
Status: DISCONTINUED | OUTPATIENT
Start: 2025-02-26 | End: 2025-02-26 | Stop reason: HOSPADM

## 2025-02-26 RX ORDER — ONDANSETRON 2 MG/ML
INJECTION INTRAMUSCULAR; INTRAVENOUS
Status: DISCONTINUED | OUTPATIENT
Start: 2025-02-26 | End: 2025-02-26 | Stop reason: SDUPTHER

## 2025-02-26 RX ORDER — HYDRALAZINE HYDROCHLORIDE 20 MG/ML
10 INJECTION INTRAMUSCULAR; INTRAVENOUS
Status: DISCONTINUED | OUTPATIENT
Start: 2025-02-26 | End: 2025-02-26 | Stop reason: HOSPADM

## 2025-02-26 RX ORDER — WATER 10 ML/10ML
INJECTION INTRAMUSCULAR; INTRAVENOUS; SUBCUTANEOUS
Status: DISPENSED
Start: 2025-02-26 | End: 2025-02-27

## 2025-02-26 RX ORDER — DEXMEDETOMIDINE HYDROCHLORIDE 100 UG/ML
INJECTION, SOLUTION INTRAVENOUS
Status: DISCONTINUED | OUTPATIENT
Start: 2025-02-26 | End: 2025-02-26 | Stop reason: SDUPTHER

## 2025-02-26 RX ORDER — SODIUM CHLORIDE 0.9 % (FLUSH) 0.9 %
5-40 SYRINGE (ML) INJECTION EVERY 12 HOURS SCHEDULED
Status: DISCONTINUED | OUTPATIENT
Start: 2025-02-26 | End: 2025-02-26 | Stop reason: HOSPADM

## 2025-02-26 RX ORDER — PHENYLEPHRINE HCL IN 0.9% NACL 1 MG/10 ML
SYRINGE (ML) INTRAVENOUS
Status: DISCONTINUED | OUTPATIENT
Start: 2025-02-26 | End: 2025-02-26 | Stop reason: SDUPTHER

## 2025-02-26 RX ORDER — METHADONE HYDROCHLORIDE 10 MG/ML
320 CONCENTRATE ORAL EVERY MORNING
Status: DISCONTINUED | OUTPATIENT
Start: 2025-02-26 | End: 2025-02-27 | Stop reason: HOSPADM

## 2025-02-26 RX ORDER — MEPERIDINE HYDROCHLORIDE 25 MG/ML
12.5 INJECTION INTRAMUSCULAR; INTRAVENOUS; SUBCUTANEOUS EVERY 5 MIN PRN
Status: DISCONTINUED | OUTPATIENT
Start: 2025-02-26 | End: 2025-02-26 | Stop reason: HOSPADM

## 2025-02-26 RX ORDER — DIPHENHYDRAMINE HYDROCHLORIDE 50 MG/ML
12.5 INJECTION, SOLUTION INTRAMUSCULAR; INTRAVENOUS
Status: DISCONTINUED | OUTPATIENT
Start: 2025-02-26 | End: 2025-02-26 | Stop reason: HOSPADM

## 2025-02-26 RX ORDER — PROPOFOL 10 MG/ML
INJECTION, EMULSION INTRAVENOUS
Status: DISCONTINUED | OUTPATIENT
Start: 2025-02-26 | End: 2025-02-26 | Stop reason: SDUPTHER

## 2025-02-26 RX ORDER — LIDOCAINE HYDROCHLORIDE 20 MG/ML
INJECTION, SOLUTION INTRAVENOUS
Status: DISCONTINUED | OUTPATIENT
Start: 2025-02-26 | End: 2025-02-26 | Stop reason: SDUPTHER

## 2025-02-26 RX ORDER — MORPHINE SULFATE 1 MG/ML
INJECTION, SOLUTION EPIDURAL; INTRATHECAL; INTRAVENOUS
Status: DISCONTINUED | OUTPATIENT
Start: 2025-02-26 | End: 2025-02-26 | Stop reason: SDUPTHER

## 2025-02-26 RX ORDER — SODIUM CHLORIDE 0.9 % (FLUSH) 0.9 %
5-40 SYRINGE (ML) INJECTION PRN
Status: DISCONTINUED | OUTPATIENT
Start: 2025-02-26 | End: 2025-02-26 | Stop reason: HOSPADM

## 2025-02-26 RX ORDER — PROMETHAZINE HYDROCHLORIDE 25 MG/1
12.5 TABLET ORAL EVERY 6 HOURS PRN
Status: DISCONTINUED | OUTPATIENT
Start: 2025-02-26 | End: 2025-02-27 | Stop reason: HOSPADM

## 2025-02-26 RX ORDER — ONDANSETRON 2 MG/ML
4 INJECTION INTRAMUSCULAR; INTRAVENOUS EVERY 6 HOURS PRN
Status: DISCONTINUED | OUTPATIENT
Start: 2025-02-26 | End: 2025-02-27 | Stop reason: HOSPADM

## 2025-02-26 RX ORDER — ACETAMINOPHEN 325 MG/1
650 TABLET ORAL EVERY 6 HOURS PRN
Status: DISCONTINUED | OUTPATIENT
Start: 2025-02-26 | End: 2025-02-27 | Stop reason: HOSPADM

## 2025-02-26 RX ORDER — ACETAMINOPHEN 650 MG/1
650 SUPPOSITORY RECTAL EVERY 6 HOURS PRN
Status: DISCONTINUED | OUTPATIENT
Start: 2025-02-26 | End: 2025-02-27 | Stop reason: HOSPADM

## 2025-02-26 RX ORDER — POLYETHYLENE GLYCOL 3350 17 G/17G
17 POWDER, FOR SOLUTION ORAL DAILY PRN
Status: DISCONTINUED | OUTPATIENT
Start: 2025-02-26 | End: 2025-02-27 | Stop reason: HOSPADM

## 2025-02-26 RX ORDER — CEFAZOLIN SODIUM 1 G/3ML
INJECTION, POWDER, FOR SOLUTION INTRAMUSCULAR; INTRAVENOUS
Status: DISCONTINUED | OUTPATIENT
Start: 2025-02-26 | End: 2025-02-26 | Stop reason: SDUPTHER

## 2025-02-26 RX ORDER — FENTANYL CITRATE 50 UG/ML
INJECTION, SOLUTION INTRAMUSCULAR; INTRAVENOUS
Status: DISCONTINUED | OUTPATIENT
Start: 2025-02-26 | End: 2025-02-26 | Stop reason: SDUPTHER

## 2025-02-26 RX ORDER — OXYCODONE AND ACETAMINOPHEN 5; 325 MG/1; MG/1
1 TABLET ORAL EVERY 6 HOURS PRN
Qty: 28 TABLET | Refills: 0 | Status: SHIPPED | OUTPATIENT
Start: 2025-02-26 | End: 2025-03-05

## 2025-02-26 RX ORDER — KETOROLAC TROMETHAMINE 30 MG/ML
30 INJECTION, SOLUTION INTRAMUSCULAR; INTRAVENOUS EVERY 6 HOURS
Status: COMPLETED | OUTPATIENT
Start: 2025-02-27 | End: 2025-02-27

## 2025-02-26 RX ORDER — METHADONE HYDROCHLORIDE 10 MG/ML
320 CONCENTRATE ORAL DAILY
COMMUNITY

## 2025-02-26 RX ORDER — GABAPENTIN 300 MG/1
600 CAPSULE ORAL 3 TIMES DAILY
Status: DISCONTINUED | OUTPATIENT
Start: 2025-02-27 | End: 2025-02-27 | Stop reason: HOSPADM

## 2025-02-26 RX ORDER — GLYCOPYRROLATE 0.2 MG/ML
INJECTION INTRAMUSCULAR; INTRAVENOUS
Status: DISCONTINUED | OUTPATIENT
Start: 2025-02-26 | End: 2025-02-26 | Stop reason: SDUPTHER

## 2025-02-26 RX ORDER — NICOTINE 21 MG/24HR
1 PATCH, TRANSDERMAL 24 HOURS TRANSDERMAL DAILY
Status: DISCONTINUED | OUTPATIENT
Start: 2025-02-26 | End: 2025-02-27

## 2025-02-26 RX ADMIN — GABAPENTIN 600 MG: 300 CAPSULE ORAL at 23:52

## 2025-02-26 RX ADMIN — Medication 100 MCG: at 18:02

## 2025-02-26 RX ADMIN — Medication 200 MCG: at 17:41

## 2025-02-26 RX ADMIN — BUPIVACAINE HYDROCHLORIDE IN DEXTROSE 13.5 MG: 7.5 INJECTION, SOLUTION SUBARACHNOID at 17:27

## 2025-02-26 RX ADMIN — CEFAZOLIN 2 G: 1 INJECTION, POWDER, FOR SOLUTION INTRAMUSCULAR; INTRAVENOUS at 17:36

## 2025-02-26 RX ADMIN — GLYCOPYRROLATE 0.2 MG: 0.2 INJECTION INTRAMUSCULAR; INTRAVENOUS at 18:06

## 2025-02-26 RX ADMIN — HYDROMORPHONE HYDROCHLORIDE 0.5 MG: 1 INJECTION, SOLUTION INTRAMUSCULAR; INTRAVENOUS; SUBCUTANEOUS at 08:01

## 2025-02-26 RX ADMIN — PROPOFOL 100 MCG/KG/MIN: 10 INJECTION, EMULSION INTRAVENOUS at 17:27

## 2025-02-26 RX ADMIN — SODIUM CHLORIDE: 0.9 INJECTION, SOLUTION INTRAVENOUS at 10:13

## 2025-02-26 RX ADMIN — Medication 100 MCG: at 18:28

## 2025-02-26 RX ADMIN — METHADONE HYDROCHLORIDE 320 MG: 10 CONCENTRATE ORAL at 10:02

## 2025-02-26 RX ADMIN — DEXMEDETOMIDINE HYDROCHLORIDE 5 MCG: 100 INJECTION, SOLUTION INTRAVENOUS at 17:39

## 2025-02-26 RX ADMIN — KETOROLAC TROMETHAMINE 30 MG: 30 INJECTION, SOLUTION INTRAMUSCULAR; INTRAVENOUS at 23:52

## 2025-02-26 RX ADMIN — Medication 200 MCG: at 17:35

## 2025-02-26 RX ADMIN — DEXAMETHASONE SODIUM PHOSPHATE 10 MG: 10 INJECTION, SOLUTION INTRAMUSCULAR; INTRAVENOUS at 17:56

## 2025-02-26 RX ADMIN — SODIUM CHLORIDE: 9 INJECTION, SOLUTION INTRAVENOUS at 18:30

## 2025-02-26 RX ADMIN — MORPHINE SULFATE 0.15 MG: 1 INJECTION, SOLUTION EPIDURAL; INTRATHECAL; INTRAVENOUS at 17:39

## 2025-02-26 RX ADMIN — ONDANSETRON 4 MG: 2 INJECTION, SOLUTION INTRAMUSCULAR; INTRAVENOUS at 18:32

## 2025-02-26 RX ADMIN — LIDOCAINE HYDROCHLORIDE 40 MG: 20 INJECTION, SOLUTION INTRAVENOUS at 17:24

## 2025-02-26 RX ADMIN — FENTANYL CITRATE 75 MCG: 50 INJECTION, SOLUTION INTRAMUSCULAR; INTRAVENOUS at 17:25

## 2025-02-26 RX ADMIN — SODIUM CHLORIDE: 9 INJECTION, SOLUTION INTRAVENOUS at 17:21

## 2025-02-26 ASSESSMENT — LIFESTYLE VARIABLES
HOW MANY STANDARD DRINKS CONTAINING ALCOHOL DO YOU HAVE ON A TYPICAL DAY: PATIENT DOES NOT DRINK
HOW OFTEN DO YOU HAVE A DRINK CONTAINING ALCOHOL: NEVER
SMOKING_STATUS: 1

## 2025-02-26 ASSESSMENT — PAIN DESCRIPTION - LOCATION
LOCATION: LEG;HIP
LOCATION: LEG;BACK

## 2025-02-26 ASSESSMENT — PAIN - FUNCTIONAL ASSESSMENT
PAIN_FUNCTIONAL_ASSESSMENT: NONE - DENIES PAIN
PAIN_FUNCTIONAL_ASSESSMENT: NONE - DENIES PAIN

## 2025-02-26 ASSESSMENT — PAIN SCALES - GENERAL
PAINLEVEL_OUTOF10: 10
PAINLEVEL_OUTOF10: 10
PAINLEVEL_OUTOF10: 9

## 2025-02-26 ASSESSMENT — PAIN DESCRIPTION - ORIENTATION
ORIENTATION: LEFT
ORIENTATION: LEFT

## 2025-02-26 ASSESSMENT — COPD QUESTIONNAIRES: CAT_SEVERITY: MODERATE

## 2025-02-26 ASSESSMENT — PAIN DESCRIPTION - DESCRIPTORS
DESCRIPTORS: ACHING;TENDER
DESCRIPTORS: ACHING;THROBBING;STABBING

## 2025-02-26 NOTE — H&P
[facility]   HISTORY AND PHYSICAL EXAM      AUTHOR: Josh Cornejo MD PATIENT NAME: Praveen Courtney   PCP: No primary care provider on file.  MRN: [id number], : 1980       CHIEF COMPLAINT / REASON FOR ADMISSION: Left thigh and hip pain after a fall  HPI:   This is a 44 y.o. male  has a past medical history of Drug abuse (HCC), Hepatitis C, and Scoliosis. presented with Left thigh and hip pain after a fall  for last few hours prior to arrival to the hospital.  He has a h/p left BKA and fell on the stump and started to hip pain and unable to ambulate and came to ED.  The patient was seen and examined at bedside, appears alert and awake with no acute distress and is able to answer simple  questions. On direct questioning, patient denied any  resting ongoing chest pain, resting SOB, hemoptysis, productive cough, fever, ongoing palpitation, active abdominal pain, hematemesis, rectal bleeding, ariella, hematuria, any other  and GI complaints, and any new focal neuro deficits.    ROS:  Pertinent positives and negatives are noted in the HPI, all other systems are reviewed and negative    PMH:  Past Medical History:   Diagnosis Date    Drug abuse (HCC)     Hepatitis C     Scoliosis        Surgical History:  Past Surgical History:   Procedure Laterality Date    FASCIOTOMY  2012    left lower leg       Medications Prior to Admission:    Prior to Admission medications    Medication Sig Start Date End Date Taking? Authorizing Provider   ibuprofen (IBU) 600 MG tablet Take 1 tablet by mouth every 6 hours as needed for Pain 20  Edna Gomez PA-C   lidocaine viscous hcl (XYLOCAINE) 2 % SOLN solution Take 5 mLs by mouth 3 times daily as needed for Irritation Soak 5 mL's of viscous lidocaine with a cotton ball.  Tuck the cotton ball along the side of the gums next to area of dental pain. 20   Edna Gomez PA-C   oxycodone-acetaminophen (PERCOCET) 5-325 MG per  PROVIDED HISTORY: Reason for exam:->fall, hx of BKA FINDINGS: Comminuted and mildly angulated/displaced fracture of the distal femoral metadiaphysis.  Patella intact.  BKA amputation.  Soft tissues unremarkable.     Comminuted and mildly angulated/displaced fracture of the distal femoral metadiaphysis.     ASSESSMENT:    Present on Admission:   Closed intertrochanteric fracture of left femur, initial encounter ([facility])   Fracture of proximal end of left femur, closed, initial encounter ([facility])    PLAN:  # Left Distal distal femoral fracture - after a fall.  NPO, IV fluid  Ortho consult - anticipate surgery in AM  PRN pain meds  # H/o methadone dependence   Dose to be confirmed with [employer] in AM   Will continue the dose.  # Pre-Op Medical evaluation:  Patient is afebrile and no resting SOB, No active chest pain/angina. No  tachycardia or hypotension,  A&Ox3, hemodynamically stable.   Patient can walk many blocks and clime multiple flight of stairs and can do moderate exercise.  No change in functional level lately.   No H/O CAD, valvular heart disease, CHF/Pulmonary edema, tachycardia, hypotension, and Hypoxia  No recent cardiac intervention/stents, active coagulopathy, and No warfarin/eliquis/xarelto in last 24 hours  .    As of now, this is a 44 y.o. male with average risk and good baseline functional status who might undergo a urgent procedure/surgery as planned by primary team. Unless any new cardiac symptoms develops or clinical deterioration, the patient can undergo the procedure/surgery with low risk.    # Diet & Fluid as on chart, ordered.  # Code Status: Full code  # DVT Prophylaxis : As ordered on chart  The patient at bedside was counseled about clinical status, laboratory/imaging results, diagnoses, medication side effects, risk, and treatment plan, all questions were answered to patient's satisfaction and verbalized understanding      SIGNATURE: Josh Cornejo MD PATIENT NAME: Praveen Courtney   CONTACT #:  Hospitalist on call MRN: [id number]     Disclaimer: Portions of this note may have been generated using Dragon voice recognition software. Reasonable efforts were made to correct any dictation errors that resulted due to the programming of this software but some may still be present.

## 2025-02-26 NOTE — PROGRESS NOTES
Patient seen and examined by me.  Chart reviewed.  I read the history and physical by my night colleague.    Patient asking to get his 320 mg dose of methadone.  He has been on that dose for 1 year.  He goes to [employer].    No events since admission.  No fevers.    Asking me for nicotine patch, says he has a lot of craving of's cigarettes and that might be why he is agitated this morning.    The nurse called [facility] to confirm his dose.    No cough wheezing or shortness of breath.  No chest pain.    Nauseous got his methadone the pain of his fracture is now controlled.    Assessment:  Left distal femur fracture, patient has prosthesis on the left after having a left above-the-knee amputation for compartment syndrome in the past    Plan:  Orthopedics on board  For OR today  Continue methadone 320 mg daily    Probably will not need any other pain control besides methadone.    Nicotine patch 21 mg topical    Consider nicotine lozenges after surgery, patient is interested    PT and OT and likely eventually skilled nursing facility discharge.    Patient's nurse spoke to me.

## 2025-02-26 NOTE — OP NOTE
Operative Note      Patient: Praveen Courtney  YOB: 1980  MRN: [id number]    Date of Procedure: 2/26/2025    Diagnosis: Left distal femur fracture supracondylar  Post-Op Diagnosis: Same       Procedure(s):  LEFT FEMUR OPEN REDUCTION INTERNAL FIXATION    Surgeon(s):  Diego Mccoy DO    Assistant:   Resident: Yasir Sanders DO; Marlo Cook DO    Anesthesia: General    Estimated Blood Loss (mL): Minimal    Complications: None    Specimens:   * No specimens in log *    Implants:  Implant Name Type Inv. Item Serial No.  Lot No. LRB No. Used Action   NAIL IM L 360 MM DIA11 MM TI FEM RETROGRADE STRL T2 ALPHA - [id number]  NAIL IM L 360 MM DIA11 MM TI FEM RETROGRADE STRL T2 ALPHA  [employer] [facility] [id number] Left 1 Implanted   SCREW BNE LCK 5X80 MM ADV STRL - [id number]  SCREW BNE LCK 5X80 MM ADV STRL  [employer] [facility] [id number] Left 1 Implanted   SCREW BNE LCK 5X65 MM ADV STRL ALPHA - [id number]  SCREW BNE LCK 5X65 MM ADV STRL ALPHA  [employer] [facility]  Left 1 Implanted   SCREW BNE ADV LCK [id number] - [id number]  SCREW BNE ADV LCK [id number]  [employer] [facility] [id number] Left 1 Implanted   SCREW BNE LCK 5X80 MM ADV STRL - [id number]  SCREW BNE LCK 5X80 MM ADV STRL  [employer] [facility] [id number] Left 1 Implanted   SCREW LK 5.0X37MM - [id number]  SCREW LK 5.0X37MM  [employer] [facility] [id number] Left 1 Implanted         Drains: * No LDAs found *    Findings:  Infection Present At Time Of Surgery (PATOS) (choose all levels that have infection present):  No infection present  Other Findings: None    Detailed Description of Procedure:   Patient was brought to preop holding area, he was identified in the left lower extremity was marked by Dr. Mccoy.  The patient was then brought back to the OR by anesthesia.  Once in the OR patient was intubated and spinal anesthesia was performed.  All operative extremities were well-padded.  The left lower extremity  nail positioning and screw positioning we are happy with final result.  We then copiously irrigated all wounds and closed in layered fashion using 0 Vicryl in the patella tendon, 2-0 Vicryl for skin and closed the superficial skin with staples.  Aquacel was applied to all sites    Patient was then awakened by anesthesia and brought to PACU in careful fashion.  Patient will go back to the floor, home on Percocet and aspirin.  Patient will be nonweightbearing left lower extremity at this time to let the incision heal.  Dr. Mccoy was not there for the entirety of the surgery.    Agree with above.  I was present and performed all critical aspects fo the surgery.      Electronically signed by Yasir Sanders DO on 2/26/2025 at 6:53 PM

## 2025-02-26 NOTE — DISCHARGE INSTRUCTIONS
Orthopedic discharge instructions:  Aspirin 81 mg twice daily for DVT prophylaxis  Pain control as prescribed  Call soon as possible for follow-up appointment in 2 weeks    Your information:  Name: Praveen Courtney  : 1980    Your instructions:  Discharge Home    What to do after you leave the hospital:    Recommended diet: regular diet    Recommended activity: Non-weightbearing to left lower extremity to allow for soft tissue healing     The following personal items were collected during your admission and were returned to you:    Belongings  Dental Appliances: None  Vision - Corrective Lenses: None  Hearing Aid: None  Clothing: Footwear, Pants, Shirt, Socks, Undergarments, At bedside (prosthesis at bedside)  Jewelry: None  Body Piercings Removed: N/A  Electronic Devices: Cell Phone, At bedside, Ear Phones/Buds  Weapons (Notify Protective Services/Security): None  Other Valuables: At home  Home Medications: None  Valuables Given To: Patient  Provide Name(s) of Who Valuable(s) Were Given To: NA  Responsible person(s) in the waiting room: NA  Patient approves for provider to speak to responsible person post operatively: Yes    Information obtained by:  By signing below, I understand that if any problems occur once I leave the hospital I am to contact PCP.  I understand and acknowledge receipt of the instructions indicated above.      Open Reduction With Internal Fixation of a Limb: What to Expect at Home  Your Recovery  Your broken bone (fracture) was put into position and stabilized. You can expect some pain and swelling around the cut (incision) the doctor made. This should get better within a few days after your surgery. But it is normal to have some pain for 2 to 3 weeks after surgery and mild pain for up to 6 weeks after surgery.  How soon you can return to work and your normal routine depends on your job and how long it takes the bone to heal. For example, if you have a fractured leg and you sit at work,

## 2025-02-26 NOTE — ANESTHESIA PRE PROCEDURE
Department of Anesthesiology  Preprocedure Note       Name:  Praveen Courtney   Age:  44 y.o.  :  1980                                          MRN:  [id number]         Date:  2025      Surgeon: Surgeon(s):  Diego Mccoy DO    Procedure: Procedure(s):  LEFT FEMUR OPEN REDUCTION INTERNAL FIXATION    Medications prior to admission:   Prior to Admission medications    Medication Sig Start Date End Date Taking? Authorizing Provider   ibuprofen (IBU) 600 MG tablet Take 1 tablet by mouth every 6 hours as needed for Pain 20  Edna Gomez PA-C   lidocaine viscous hcl (XYLOCAINE) 2 % SOLN solution Take 5 mLs by mouth 3 times daily as needed for Irritation Soak 5 mL's of viscous lidocaine with a cotton ball.  Tuck the cotton ball along the side of the gums next to area of dental pain. 20   Edna Gomez PA-C   oxycodone-acetaminophen (PERCOCET) 5-325 MG per tablet Take 1 tablet by mouth every 6 hours as needed for Pain for 60 doses. 5/15/12   Diego Mccoy DO   acetaminophen (TYLENOL) 325 MG tablet Take 2 tablets by mouth every 4 hours as needed for Pain (For mild pain level 1-3 or for fever > 100.5). 12   Arnaldo Begum MD       Current medications:    Current Facility-Administered Medications   Medication Dose Route Frequency Provider Last Rate Last Admin    HYDROmorphone (DILAUDID) injection 0.5 mg  0.5 mg IntraVENous Q4H PRN Josh Cornejo MD        0.9 % sodium chloride infusion   IntraVENous Continuous Josh Cornejo MD        promethazine (PHENERGAN) tablet 12.5 mg  12.5 mg Oral Q6H PRN Josh Cornejo MD        Or    ondansetron (ZOFRAN) injection 4 mg  4 mg IntraVENous Q6H PRN Josh Cornejo MD        polyethylene glycol (GLYCOLAX) packet 17 g  17 g Oral Daily PRN Josh Cornejo MD        acetaminophen (TYLENOL) tablet 650 mg  650 mg Oral Q6H PRN Josh Cornejo MD        Or    acetaminophen (TYLENOL) suppository 650 mg  650 mg Rectal Q6H PRN Clemente

## 2025-02-26 NOTE — PROGRESS NOTES
Spoke with Oralia at [facility] in [place], confirmed patient takes 320 mg liquid methadone daily.

## 2025-02-26 NOTE — ANESTHESIA PROCEDURE NOTES
Spinal Block    Patient location during procedure: OR  End time: 2/26/2025 5:39 PM  Reason for block: primary anesthetic  Staffing  Performed: other anesthesia staff and resident/CRNA   Anesthesiologist: Rogelio Chanel MD  Resident/CRNA: Dana Tran APRN - CRNA  Other anesthesia staff: Dennis Dumont RN  Performed by: Dennis Dumont RN  Authorized by: Rogelio Chanel MD    Spinal Block  Patient position: left lateral decubitus  Prep: Betadine and site prepped and draped  Patient monitoring: cardiac monitor, continuous pulse ox and frequent blood pressure checks  Approach: midline  Location: L3/L4  Provider prep: sterile gloves and mask  Local infiltration: lidocaine  Needle  Needle type: Pencan   Needle gauge: 25 G  Needle length: 4 in  Assessment  Sensory level: T4  Swirl obtained: Yes  CSF: clear  Attempts: 2  Hemodynamics: stable  Preanesthetic Checklist  Completed: patient identified, IV checked, site marked, risks and benefits discussed, surgical/procedural consents, equipment checked, pre-op evaluation, timeout performed, anesthesia consent given, oxygen available, monitors applied/VS acknowledged, fire risk safety assessment completed and verbalized and blood product R/B/A discussed and consented

## 2025-02-26 NOTE — ED PROVIDER NOTES
[facility] EMERGENCY DEPARTMENT  EMERGENCY DEPARTMENT ENCOUNTER        Pt Name: Praveen Courtney  MRN: [id number]  Birthdate 1980  Date of evaluation: 2/25/2025  Provider: Ismael Romero DO  PCP: No primary care provider on file.  Note Started: 10:55 PM EST 2/25/25    CHIEF COMPLAINT       Chief Complaint   Patient presents with    Leg Pain     Pt fell on his stump from R BKA. Pt states he has pain every time he moves it.       HISTORY OF PRESENT ILLNESS: 1 or more Elements   History From: patient    Limitations to history : None    Praveen Courtney is a 44 y.o. male who presents to emergency department for left lower extremity pain after he sustained a fall.  He has a amputation at the knee status post what sounds like a compartment syndrome years ago.  He reports that he fell and landed on the bottom of the stump.  Did not his head or lose consciousness.  He has no other complaints of pain other than the stump. Patient denies fever, chills, headache, shortness of breath, chest pain, abdominal pain, nausea, vomiting, diarrhea.    Nursing Notes were all reviewed and agreed with or any disagreements were addressed in the HPI.        REVIEW OF SYSTEMS :           Positives and Pertinent negatives as per HPI.     SURGICAL HISTORY     Past Surgical History:   Procedure Laterality Date    FASCIOTOMY  april 2012    left lower leg       CURRENTMEDICATIONS       Previous Medications    ACETAMINOPHEN (TYLENOL) 325 MG TABLET    Take 2 tablets by mouth every 4 hours as needed for Pain (For mild pain level 1-3 or for fever > 100.5).    IBUPROFEN (IBU) 600 MG TABLET    Take 1 tablet by mouth every 6 hours as needed for Pain    LIDOCAINE VISCOUS HCL (XYLOCAINE) 2 % SOLN SOLUTION    Take 5 mLs by mouth 3 times daily as needed for Irritation Soak 5 mL's of viscous lidocaine with a cotton ball.  Tuck the cotton ball along the side of the gums next to area of dental pain.    OXYCODONE-ACETAMINOPHEN  Discussed the options of vitrectomy surgery with membrane peeling vs. observation alone. Risks, benefits and alternatives were discussed in detail. All questions were answered. Discussed case Dr. Cornejo, accepts patient for admission.  Basic labs and type and screen were ordered.      CONSULTS: (Who and What was discussed)  IP CONSULT TO ORTHOPEDIC SURGERY        FINAL IMPRESSION      1. Closed displaced comminuted fracture of shaft of left femur, initial encounter ([facility])          DISPOSITION/PLAN     DISPOSITION Decision To Admit 02/26/2025 04:39:03 AM    DISPOSITION  Disposition: Admit to med/surg floor  Patient condition is stable    2/25/25, 10:55 PM EST.    Ismael Romero DO  Emergency Medicine    PATIENT REFERRED TO:  No follow-up provider specified.    DISCHARGE MEDICATIONS:  New Prescriptions    No medications on file       DISCONTINUED MEDICATIONS:  Discontinued Medications    No medications on file              (Please note that portions of this note were completed with a voice recognition program.  Efforts were made to edit the dictations but occasionally words are mis-transcribed.)    Ismael Romero DO (electronically signed)

## 2025-02-26 NOTE — PLAN OF CARE
Problem: Discharge Planning  Goal: Discharge to home or other facility with appropriate resources  Outcome: Progressing  Flowsheets (Taken 2/26/2025 4930)  Discharge to home or other facility with appropriate resources:   Identify barriers to discharge with patient and caregiver   Identify discharge learning needs (meds, wound care, etc)     Problem: Pain  Goal: Verbalizes/displays adequate comfort level or baseline comfort level  Outcome: Progressing     Problem: Safety - Adult  Goal: Free from fall injury  Outcome: Progressing     Problem: ABCDS Injury Assessment  Goal: Absence of physical injury  Outcome: Progressing

## 2025-02-26 NOTE — CONSULTS
Department of Orthopedic Trauma Surgery  Resident consult note      CHIEF COMPLAINT:   Chief Complaint   Patient presents with    Leg Pain     Pt fell on his stump from R BKA. Pt states he has pain every time he moves it.       HISTORY OF PRESENT ILLNESS:                Patient is a 44 y.o. male who presents with left distal femur pain.  Patient is status post left through knee amputation done in 2012 secondary to compartment syndrome and ischemic lower extremity.  After amputation has had no significant complications or issues and uses a prosthesis regularly for ambulation.  Last night he was at his brother's house when he tripped over a coat on the floor causing him to land onto the knee.  He had immediate pain to the distal femur and difficulty with ambulation.   Denies any other orthopedic complaints at this time.         Past Medical History:        Diagnosis Date    Drug abuse (HCC)     Hepatitis C     Scoliosis      Past Surgical History:        Procedure Laterality Date    FASCIOTOMY  april 2012    left lower leg     Current Medications:   No current facility-administered medications for this encounter.  Allergies:  Patient has no known allergies.    Social History:   TOBACCO:   reports that he has been smoking cigarettes. He has a 17 pack-year smoking history. He has never used smokeless tobacco.  ETOH:   reports no history of alcohol use.  DRUGS:   reports that he does not currently use drugs after having used the following drugs: Marijuana (Weed) and Opiates .  ACTIVITIES OF DAILY LIVING:    OCCUPATION:    Family History:   No family history on file.    REVIEW OF SYSTEMS:   Skin: no acute changes  Eyes: no acute changes  Ears/Nose/Throat: no acute changes  Respiratory: No increased work of breathing, no coughing  Cardiovascular: Brisk capillary refill bilaterally, well perfused extremities  Gastrointestinal: no acute changes  Neurologic: no acute changes  MUSCULOSKELETAL:  positive for  pain      PHYSICAL  04/27/2012 05:20 AM       Radiology Review:  X-ray left femur demonstrates status post left through knee amputation with a supracondylar distal femur fracture with flexion deformity.    IMPRESSION:  Left supracondylar distal femur fracture  S/p left through knee amputation    PLAN:  Discussed radiographic and physical exam findings with patient today.  Did discuss recommendation for operative intervention.  He is understanding and agreeable with this plan.  Risk benefits alternatives were thoroughly discussed and any and all questions were answered him today.  Nonweightbearing left lower extremity  Pain control as needed  Tentative plan for operative invention today  N.p.o. now  D/W attending  I was present with the resident during the history and exam. I discussed the case with the resident and agree with the findings and plan as documented in the resident's note.       Electronically signed by Jhon Hinton DO   Note: This report was completed using [employer] voiced recognition software.  Every effort has been made to ensure accuracy; however, inadvertent computerized transcription errors may be present.

## 2025-02-27 VITALS
HEART RATE: 76 BPM | HEIGHT: 68 IN | RESPIRATION RATE: 16 BRPM | WEIGHT: 150 LBS | DIASTOLIC BLOOD PRESSURE: 78 MMHG | OXYGEN SATURATION: 96 % | BODY MASS INDEX: 22.73 KG/M2 | SYSTOLIC BLOOD PRESSURE: 132 MMHG | TEMPERATURE: 98 F

## 2025-02-27 PROBLEM — F17.200 SMOKER: Status: ACTIVE | Noted: 2025-02-27

## 2025-02-27 LAB
BASOPHILS # BLD: 0.01 K/UL (ref 0–0.2)
BASOPHILS NFR BLD: 0 % (ref 0–2)
EOSINOPHIL # BLD: 0 K/UL (ref 0.05–0.5)
EOSINOPHILS RELATIVE PERCENT: 0 % (ref 0–6)
ERYTHROCYTE [DISTWIDTH] IN BLOOD BY AUTOMATED COUNT: 12.7 % (ref 11.5–15)
HCT VFR BLD AUTO: 34.9 % (ref 37–54)
HGB BLD-MCNC: 12 G/DL (ref 12.5–16.5)
IMM GRANULOCYTES # BLD AUTO: 0.03 K/UL (ref 0–0.58)
IMM GRANULOCYTES NFR BLD: 0 % (ref 0–5)
LYMPHOCYTES NFR BLD: 1.15 K/UL (ref 1.5–4)
LYMPHOCYTES RELATIVE PERCENT: 12 % (ref 20–42)
MCH RBC QN AUTO: 30.5 PG (ref 26–35)
MCHC RBC AUTO-ENTMCNC: 34.4 G/DL (ref 32–34.5)
MCV RBC AUTO: 88.8 FL (ref 80–99.9)
MONOCYTES NFR BLD: 0.52 K/UL (ref 0.1–0.95)
MONOCYTES NFR BLD: 5 % (ref 2–12)
NEUTROPHILS NFR BLD: 82 % (ref 43–80)
NEUTS SEG NFR BLD: 7.96 K/UL (ref 1.8–7.3)
PLATELET # BLD AUTO: 182 K/UL (ref 130–450)
PMV BLD AUTO: 9.9 FL (ref 7–12)
RBC # BLD AUTO: 3.93 M/UL (ref 3.8–5.8)
WBC OTHER # BLD: 9.7 K/UL (ref 4.5–11.5)

## 2025-02-27 PROCEDURE — 96374 THER/PROPH/DIAG INJ IV PUSH: CPT

## 2025-02-27 PROCEDURE — 6370000000 HC RX 637 (ALT 250 FOR IP)

## 2025-02-27 PROCEDURE — 96375 TX/PRO/DX INJ NEW DRUG ADDON: CPT

## 2025-02-27 PROCEDURE — 85025 COMPLETE CBC W/AUTO DIFF WBC: CPT

## 2025-02-27 PROCEDURE — 36415 COLL VENOUS BLD VENIPUNCTURE: CPT

## 2025-02-27 PROCEDURE — 97535 SELF CARE MNGMENT TRAINING: CPT

## 2025-02-27 PROCEDURE — 99238 HOSP IP/OBS DSCHRG MGMT 30/<: CPT | Performed by: FAMILY MEDICINE

## 2025-02-27 PROCEDURE — G0378 HOSPITAL OBSERVATION PER HR: HCPCS

## 2025-02-27 PROCEDURE — 6360000002 HC RX W HCPCS: Performed by: FAMILY MEDICINE

## 2025-02-27 PROCEDURE — 6360000002 HC RX W HCPCS

## 2025-02-27 PROCEDURE — 2500000003 HC RX 250 WO HCPCS

## 2025-02-27 PROCEDURE — 97165 OT EVAL LOW COMPLEX 30 MIN: CPT

## 2025-02-27 PROCEDURE — 6370000000 HC RX 637 (ALT 250 FOR IP): Performed by: FAMILY MEDICINE

## 2025-02-27 PROCEDURE — 97161 PT EVAL LOW COMPLEX 20 MIN: CPT | Performed by: PHYSICAL THERAPIST

## 2025-02-27 RX ORDER — NICOTINE 21 MG/24HR
1 PATCH, TRANSDERMAL 24 HOURS TRANSDERMAL DAILY
Qty: 14 PATCH | Refills: 0 | Status: SHIPPED | OUTPATIENT
Start: 2025-02-28 | End: 2025-03-14

## 2025-02-27 RX ORDER — NICOTINE 21 MG/24HR
1 PATCH, TRANSDERMAL 24 HOURS TRANSDERMAL DAILY
Status: DISCONTINUED | OUTPATIENT
Start: 2025-02-28 | End: 2025-02-27 | Stop reason: HOSPADM

## 2025-02-27 RX ORDER — ASPIRIN 81 MG/1
81 TABLET, CHEWABLE ORAL 2 TIMES DAILY
Status: DISCONTINUED | OUTPATIENT
Start: 2025-02-27 | End: 2025-02-27 | Stop reason: HOSPADM

## 2025-02-27 RX ORDER — POLYETHYLENE GLYCOL 3350 17 G/17G
17 POWDER, FOR SOLUTION ORAL DAILY PRN
Qty: 30 PACKET | Refills: 0 | Status: SHIPPED | OUTPATIENT
Start: 2025-02-27 | End: 2025-03-29

## 2025-02-27 RX ADMIN — GABAPENTIN 600 MG: 300 CAPSULE ORAL at 13:24

## 2025-02-27 RX ADMIN — METHADONE HYDROCHLORIDE 320 MG: 10 CONCENTRATE ORAL at 06:46

## 2025-02-27 RX ADMIN — HYDROMORPHONE HYDROCHLORIDE 0.25 MG: 0.5 INJECTION, SOLUTION INTRAMUSCULAR; INTRAVENOUS; SUBCUTANEOUS at 04:13

## 2025-02-27 RX ADMIN — KETOROLAC TROMETHAMINE 30 MG: 30 INJECTION, SOLUTION INTRAMUSCULAR; INTRAVENOUS at 06:11

## 2025-02-27 RX ADMIN — GABAPENTIN 600 MG: 300 CAPSULE ORAL at 08:33

## 2025-02-27 RX ADMIN — ASPIRIN 81 MG: 81 TABLET, CHEWABLE ORAL at 11:34

## 2025-02-27 RX ADMIN — CEFAZOLIN SODIUM 2000 MG: 1 POWDER, FOR SOLUTION INTRAMUSCULAR; INTRAVENOUS at 04:08

## 2025-02-27 RX ADMIN — CEFAZOLIN SODIUM 2000 MG: 1 POWDER, FOR SOLUTION INTRAMUSCULAR; INTRAVENOUS at 11:38

## 2025-02-27 ASSESSMENT — PAIN DESCRIPTION - ORIENTATION
ORIENTATION: LEFT

## 2025-02-27 ASSESSMENT — PAIN SCALES - GENERAL
PAINLEVEL_OUTOF10: 9
PAINLEVEL_OUTOF10: 9
PAINLEVEL_OUTOF10: 10
PAINLEVEL_OUTOF10: 3

## 2025-02-27 ASSESSMENT — PAIN DESCRIPTION - LOCATION
LOCATION: HIP
LOCATION: HIP;LEG
LOCATION: HIP;LEG

## 2025-02-27 ASSESSMENT — PAIN DESCRIPTION - DESCRIPTORS
DESCRIPTORS: ACHING;JABBING;THROBBING
DESCRIPTORS: ACHING;POUNDING;SQUEEZING
DESCRIPTORS: ACHING;JABBING;POUNDING

## 2025-02-27 NOTE — PROGRESS NOTES
OCCUPATIONAL THERAPY INITIAL EVALUATION    [facility]  [address] [place]        Date:2025                                                  Patient Name: Praveen Courtney    MRN: [id number]    : 1980    Room: [facility]032-      Evaluating OT: Susana Hadley OTR/L; [id number]     Referring Provider and Specific Provider Orders/Date:      25  OT eval and treat  Start:  25,   End:  25,   ONE TIME,   Standing Count:  1 Occurrences,   R         Diego Mccoy DO      Placement Recommendation: Home with no skilled occupational therapy needed after discharge from inpatient.        Diagnosis:   1. Closed displaced comminuted fracture of shaft of left femur, initial encounter ([facility])    2. Closed intertrochanteric fracture of left femur, initial encounter ([facility])    3. Fracture of proximal end of left femur, closed, initial encounter ([facility])        Procedure(s):  LEFT FEMUR OPEN REDUCTION INTERNAL FIXATION      Pertinent Medical History:       Past Medical History:   Diagnosis Date    Drug abuse ([facility])     Hepatitis C     Scoliosis          Past Surgical History:   Procedure Laterality Date    FASCIOTOMY  2012    left lower leg    LEG AMPUTATION BELOW KNEE Left       Precautions:  Fall Risk, NWB: L LE  d/t LEFT FEMUR ORIF, L BKA      Assessment of current deficits:     [x] Functional mobility  [x]ADLs  [x] Strength               []Cognition    [x] Functional transfers   [x] IADLs         [] Safety Awareness   [x]Endurance    [] Fine Coordination              [x] Balance      [] Vision/perception   []Sensation     []Gross Motor Coordination  [x] ROM  [] Delirium                   [] Motor Control     OT PLAN OF CARE   OT POC based on physician orders, patient diagnosis and results of clinical assessment    Frequency/Duration 1-3 days/wk for 2 weeks PRN     Specific OT Treatment Interventions to include:   *  of plan of care and goals.        Evaluating OT: Susana Hadley OTR/L; [id number]

## 2025-02-27 NOTE — PROGRESS NOTES
CLINICAL PHARMACY NOTE: MEDS TO BEDS    Total # of Prescriptions Filled: 5   The following medications were delivered to the patient:  Percocet 5-325 mg  Aspirin 81 mg  Nicotine Step 1 21 mg/24 hr patches  Nicotine Step 3 7 mg/24 hr patches  PEG 3350 17 gm packets    Additional Documentation:

## 2025-02-27 NOTE — DISCHARGE INSTR - DIET

## 2025-02-27 NOTE — DISCHARGE SUMMARY
[facility] Hospitalist       Hospitalist Physician Discharge Summary       Diego Mccoy DO  [address] [address]  [phone]    Schedule an appointment as soon as possible for a visit in 2 week(s)  Call as soon as possible for a follow-up appointment in 2 weeks      Activity level: up with use of wheelchair  Home PT/OT  Non-weight bearing on left AKA stump until OK with Dr. Mccoy/ortho -- do not use prosthesis during this time    Diet: ADULT DIET; Regular    Labs:  None    Condition at discharge:   Excellent/stable    Dispo:  To home with home health        Patient ID:  Praveen Courtney  [id number]  44 y.o.  1980    Admit date: 2/25/2025    Discharge date and time:  2/27/2025  4:16 PM    Admission Diagnoses: Active Problems:    S/P AKA (above knee amputation) unilateral (HCC)    Hepatitis C antibody positive in blood    Methadone maintenance therapy patient (HCC)    Closed displaced comminuted fracture of shaft of left femur (HCC)    Smoker  Resolved Problems:    * No resolved hospital problems. *      Discharge Diagnoses: Active Problems:    S/P AKA (above knee amputation) unilateral (HCC)    Hepatitis C antibody positive in blood    Methadone maintenance therapy patient (HCC)    Closed displaced comminuted fracture of shaft of left femur (HCC)    Smoker  Resolved Problems:    * No resolved hospital problems. *      Consults:  IP CONSULT TO ORTHOPEDIC SURGERY    Procedures:   Left distal femur open reduction internal fixation    Hospital Course:   44-year-old male with a past medical history of long-term cigarette use, history of opioid addiction on methadone maintenance, history of left-sided above-the-knee amputation due to left lower extremity compartment syndrome, uses a prosthesis who presented to the emergency room with pain at his stump site and unable to ambulate with his prosthesis after a fall.  Found to have a distal femur fracture.    Seen by orthopedics.  He  Disp-14 patch, R-0Normal      nicotine (NICODERM CQ) 7 MG/24HR Place 1 patch onto the skin daily for 14 days, Disp-14 patch, R-0Normal      aspirin 81 MG EC tablet Take 1 tablet by mouth 2 times daily, Disp-90 tablet, R-0Normal           CONTINUE these medications which have CHANGED    Details   oxyCODONE-acetaminophen (PERCOCET) 5-325 MG per tablet Take 1 tablet by mouth every 6 hours as needed for Pain for up to 7 days. Intended supply: 7 days. Take lowest dose possible to manage pain Max Daily Amount: 4 tablets, Disp-28 tablet, R-0Normal           CONTINUE these medications which have NOT CHANGED    Details   methadone (DOLOPHINE) 10 MG/ML solution Take 32 mLs by mouth daily. Confirmed by Oralia at [facility]  Max Daily Amount: 320 mgHistorical Med      gabapentin (NEURONTIN) 600 MG tablet Take 1 tablet by mouth 3 times daily.Historical Med      acetaminophen (TYLENOL) 325 MG tablet Take 2 tablets by mouth every 4 hours as needed for Pain (For mild pain level 1-3 or for fever > 100.5)., Disp-45 tablet, R-0           STOP taking these medications       ibuprofen (IBU) 600 MG tablet Comments:   Reason for Stopping:         lidocaine viscous hcl (XYLOCAINE) 2 % SOLN solution Comments:   Reason for Stopping:                 Note that less than 30 minutes was spent in preparing discharge papers, discussing discharge with patient, medication review, etc.    NOTE: This report was transcribed using voice recognition software. Every effort was made to ensure accuracy; however, inadvertent computerized transcription errors may be present.     Signed:  Electronically signed by Bell Phillips DO on 2/27/2025 at 4:16 PM

## 2025-02-27 NOTE — PLAN OF CARE
Problem: Discharge Planning  Goal: Discharge to home or other facility with appropriate resources  2/27/2025 0030 by Kj Cleary, RN  Outcome: Progressing     Problem: Pain  Goal: Verbalizes/displays adequate comfort level or baseline comfort level  2/27/2025 0030 by Kj Cleary, RN  Outcome: Progressing     Problem: Safety - Adult  Goal: Free from fall injury  2/27/2025 0030 by Kj Cleary, RN  Outcome: Progressing     Problem: ABCDS Injury Assessment  Goal: Absence of physical injury  2/27/2025 0030 by Kj Cleary, RN  Outcome: Progressing

## 2025-02-27 NOTE — CARE COORDINATION
Case Management Assessment  Initial Evaluation    Date/Time of Evaluation: 2/27/2025 9:52 AM  Assessment Completed by: VIRI Zarate    If patient is discharged prior to next notation, then this note serves as note for discharge by case management.    Patient Name: Praveen Sanchez                   YOB: 1980  Diagnosis: Closed displaced comminuted fracture of shaft of left femur, initial encounter ([facility]) [S72.352A]  Closed intertrochanteric fracture of left femur, initial encounter ([facility]) [S72.142A]  Fracture of proximal end of left femur, closed, initial encounter ([facility]) [S72.002A]                   Date / Time: 2/25/2025 10:30 PM    Patient Admission Status: Observation   Readmission Risk (Low < 19, Mod (19-27), High > 27): Readmission Risk Score: 5.7    Current PCP: No primary care provider on file.  PCP verified by CM? No (No PCP - [facility] PCP list provided)    Chart Reviewed: Yes      History Provided by: Patient  Patient Orientation: Alert and Oriented    Patient Cognition: Alert    Hospitalization in the last 30 days (Readmission):  No    If yes, Readmission Assessment in  Navigator will be completed.    Advance Directives:      Code Status: Full Code   Patient's Primary Decision Maker is: Legal Next of Kin    Primary Decision Maker: RENATO SANCHEZ - Brother/Sister - [phone]    Discharge Planning:    Patient lives with: Alone Type of Home: Apartment  Primary Care Giver: Self  Patient Support Systems include: Family Members   Current Financial resources:    Current community resources:    Current services prior to admission: None            Current DME:              Type of Home Care services:  None    ADLS  Prior functional level: Independent in ADLs/IADLs, Assistance with the following:, Mobility  Current functional level: Assistance with the following:, Mobility    PT AM-PAC:   /24  OT AM-PAC:   /24    Family can provide assistance at DC: Yes  Would you like Case Management to discuss the  Closed displaced comminuted fracture of shaft of left femur, initial encounter (HCC) [S72.352A]  Closed intertrochanteric fracture of left femur, initial encounter (HCC) [S72.142A]  Fracture of proximal end of left femur, closed, initial encounter (HCC) [S72.002A]    IF APPLICABLE: The Patient and/or patient representative Praveen and his family were provided with a choice of provider and agrees with the discharge plan. Freedom of choice list with basic dialogue that supports the patient's individualized plan of care/goals and shares the quality data associated with the providers was provided to:     Patient Representative Name:       The Patient and/or Patient Representative Agree with the Discharge Plan?      VIRI Zarate  Case Management Department  Ph: [phone]

## 2025-02-27 NOTE — DISCHARGE INSTR - COC
Continuity of Care Form    Patient Name: Praveen Sanchez   :  1980  MRN:  [id number]    Admit date:  2025  Discharge date:  ***    Code Status Order: Full Code   Advance Directives:   Advance Care Flowsheet Documentation        Date/Time Healthcare Directive Type of Healthcare Directive Copy in Chart Healthcare Agent Appointed Healthcare Agent's Name Healthcare Agent's Phone Number    25 1359 No, patient does not have an advance directive for healthcare treatment  --  --  --  --  --                     Admitting Physician:  Josh Cornejo MD  PCP: No primary care provider on file.    Discharging Nurse: ***  Discharging Hospital Unit/Room#: 0321/0321-02  Discharging Unit Phone Number: ***    Emergency Contact:   Extended Emergency Contact Information  Primary Emergency Contact: RENATO SANCHEZ  Home Phone: [phone]  Relation: Brother/Sister   needed? No    Past Surgical History:  Past Surgical History:   Procedure Laterality Date    FASCIOTOMY  2012    left lower leg    LEG AMPUTATION BELOW KNEE Left        Immunization History:   Immunization History   Administered Date(s) Administered    TD 2LF, TDVAX, (age 7y+), IM, 0.5mL 10/13/2023       Active Problems:  Patient Active Problem List   Diagnosis Code    Compartment syndrome T79.A0XA    Ischemic leg I99.8    S/P AKA (above knee amputation) unilateral ([facility]) Z89.619    Substance abuse ([facility]) F19.10    Renal failure N19    Pulmonary infiltrates on CXR     Closed intertrochanteric fracture of left femur, initial encounter ([facility]) S72.142A    Fracture of proximal end of left femur, closed, initial encounter ([facility]) S72.002A    Hepatitis C antibody positive in blood R76.8    Methadone maintenance therapy patient ([facility]) F11.20    Closed displaced comminuted fracture of shaft of left femur ([facility]) S72.352A       Isolation/Infection:   Isolation            No Isolation          Patient Infection Status       None to display            Nurse  Feeding: {CHP DME Other Feedings:120255044}  Liquids: {Slp liquid thickness:92659}  Daily Fluid Restriction: {CHP DME Yes amt example:044506167}  Last Modified Barium Swallow with Video (Video Swallowing Test): {Done Not Done Date:}    Treatments at the Time of Hospital Discharge:   Respiratory Treatments: ***  Oxygen Therapy:  {Therapy; copd oxygen:64477}  Ventilator:    {[facility] Vent List:583350778}    Rehab Therapies: {THERAPEUTIC INTERVENTION:3419946969}  Weight Bearing Status/Restrictions: {[facility] Weight Bearin}  Other Medical Equipment (for information only, NOT a DME order):  {EQUIPMENT:839756861}  Other Treatments: ***    Patient's personal belongings (please select all that are sent with patient):  {CHP DME Belongings:429632586}    RN SIGNATURE:  {Esignature:842662905}    CASE MANAGEMENT/SOCIAL WORK SECTION    Inpatient Status Date: ***    Readmission Risk Assessment Score:  [facility] RISK OF UNPLANNED READMISSION 2.0             5.5 Total Score        Discharging to Facility/ Agency   Name:   Address:  Phone:  Fax:    Dialysis Facility (if applicable)   Name:  Address:  Dialysis Schedule:  Phone:  Fax:    / signature: {Esignature:827144043}    PHYSICIAN SECTION    Prognosis: {Prognosis:6724790742}    Condition at Discharge: { Patient Condition:719023503}    Rehab Potential (if transferring to Rehab): {Prognosis:4338341957}    Recommended Labs or Other Treatments After Discharge: ***    Physician Certification: I certify the above information and transfer of Praveen Courtney  is necessary for the continuing treatment of the diagnosis listed and that he requires {Admit to Appropriate Level of Care:21046} for {GREATER/LESS:055298404} 30 days.     Update Admission H&P: {CHP DME Changes in HandP:783178799}    PHYSICIAN SIGNATURE:  {Esignature:121376828}

## 2025-02-27 NOTE — PROGRESS NOTES
Department of Orthopedic Surgery  Resident Progress Note    Patient seen and examined at bedside this morning.  Patient resting comfortably, pain is well-controlled.  Patient endorses significant improvement in pain following surgery.  We discussed his postoperative course and patient being nonweightbearing secondary to soft tissue healing over the stump for approximately 2 weeks and progression of weightbearing status following first postop evaluation.    VITALS:  /78   Pulse 76   Temp 98 °F (36.7 °C) (Oral)   Resp 16   Ht 1.727 m (5' 8\")   Wt 68 kg (150 lb)   SpO2 96%   BMI 22.81 kg/m²     General: alert and oriented to person, place and time, well-developed and well-nourished, in no acute distress    MUSCULOSKELETAL:   left lower extremity:  Dressing C/D/I  Compartments soft and compressible  Stump is warm well-perfused    CBC:   Lab Results   Component Value Date/Time    WBC 9.7 02/27/2025 04:26 AM    HGB 12.0 02/27/2025 04:26 AM    HCT 34.9 02/27/2025 04:26 AM     02/27/2025 04:26 AM     PT/INR:    Lab Results   Component Value Date/Time    PROTIME 11.8 04/27/2012 05:20 AM    INR 1.2 04/27/2012 05:20 AM       ASSESSMENT  S/P left femur retrograde nail on 2/26    PLAN      Continue physical therapy and protocol: Nonweightbearing to left lower extremity to allow for soft tissue healing  24 hour abx coverage, to be completed today  Deep venous thrombosis prophylaxis -aspirin 81 mg twice daily postop day 1, early mobilization  PT/OT  Pain Control: IV and PO  Monitor H&H  D/C Plan: Appreciate physical therapy and social work recommendations.  Patient would like to discharge home.  Patient is okay to discharge from orthopedic standpoint.  Orthopedics will continue to follow      Electronically signed by Marlo Cook DO on 2/27/2025 at 10:04 AM  Note: This report was completed using computerize voiced recognition software.  Every effort has been made to ensure accuracy; however, inadvertent  computerized transcription errors may be present.

## 2025-02-27 NOTE — CARE COORDINATION
2/27/2025: SS NOTE:  Notified by Nursing of discharge order and recommendation for ambulette transport home, pt informed and in agreement, pt's dme was delivered, says his brother's [address] and his family will be there to help him into the house, [facility] via w/c transport scheduled for 3:30 pm pickup, Nursing informed. Electronically signed by VIRI Zarate on 2/27/2025 at 1:59 PM

## 2025-02-27 NOTE — ANESTHESIA POSTPROCEDURE EVALUATION
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    Mable Phase I: Mable Score: 10    Mable Phase II:      Anesthesia Post Evaluation    Patient location during evaluation: PACU  Patient participation: complete - patient participated  Level of consciousness: awake  Pain score: 4  Airway patency: patent  Nausea & Vomiting: no vomiting and no nausea  Cardiovascular status: hemodynamically stable  Respiratory status: acceptable  Hydration status: stable  Pain management: adequate        No notable events documented.   no

## 2025-02-27 NOTE — PLAN OF CARE
Problem: Discharge Planning  Goal: Discharge to home or other facility with appropriate resources  2/27/2025 1046 by Kerline Cee RN  Outcome: Progressing  2/27/2025 0030 by Kj Cleary RN  Outcome: Progressing     Problem: Pain  Goal: Verbalizes/displays adequate comfort level or baseline comfort level  2/27/2025 1046 by Kerline Cee RN  Outcome: Progressing  2/27/2025 0030 by Kj Cleary RN  Outcome: Progressing     Problem: Safety - Adult  Goal: Free from fall injury  2/27/2025 1046 by Kerline Cee RN  Outcome: Progressing  2/27/2025 0030 by Kj Cleary RN  Outcome: Progressing     Problem: ABCDS Injury Assessment  Goal: Absence of physical injury  2/27/2025 1046 by Kerline Cee RN  Outcome: Progressing  2/27/2025 0030 by Kj Cleary RN  Outcome: Progressing

## 2025-02-27 NOTE — PROGRESS NOTES
Physical Therapy Initial Evaluation/Plan of Care    Room #:  0321/0321-02  Patient Name: Praveen Courtney  YOB: 1980  MRN: [id number]    Date of Service: 2/27/2025     Tentative placement recommendation: Home with Home Health Physical Therapy  Equipment recommendation: Wheeled Walker      Evaluating Physical Therapist: Kari Hurtado, PT #[id number]      Specific Provider Orders/Date/Referring Provider :  02/27/25 0830    PT eval and treat  Start:  02/27/25 0830,   End:  02/27/25 0830,   ONE TIME,   Standing Count:  1 Occurrences,   R       Diego Mccoy DO    Admitting Diagnosis:   Closed displaced comminuted fracture of shaft of left femur, initial encounter ([facility]) [S72.352A]  Closed intertrochanteric fracture of left femur, initial encounter ([facility]) [S72.142A]  Fracture of proximal end of left femur, closed, initial encounter ([facility]) [S72.002A]    h/p left BKA and fell on the stump and started to hip pain and unable to ambulate and came to ED    left distal femur pain.  Patient is status post left through knee amputation done in 2012 secondary to compartment syndrome and ischemic lower extremity.  Surgery:     Visit Diagnoses         Codes    Closed intertrochanteric fracture of left femur, initial encounter ([facility])     S72.142A    Fracture of proximal end of left femur, closed, initial encounter ([facility])     S72.002A        Date of Procedure: 2/26/2025     Diagnosis: Left distal femur fracture supracondylar  Post-Op Diagnosis: Same       Procedure(s):  LEFT FEMUR OPEN REDUCTION INTERNAL FIXATION     Surgeon(s):  Diego Mccoy DO    Patient Active Problem List   Diagnosis    Compartment syndrome    Ischemic leg    S/P AKA (above knee amputation) unilateral (HCC)    Substance abuse (HCC)    Renal failure    Pulmonary infiltrates on CXR    Hepatitis C antibody positive in blood    Methadone maintenance therapy patient (HCC)    Closed displaced comminuted fracture of shaft of left femur ([facility])    Smoker     and active problem list as listed above have been reviewed prior to the initiation of this evaluation.    OBJECTIVE:   Initial Evaluation  Date: 2/27/2025 Treatment Date:     Short Term/ Long Term   Goals   Was pt agreeable to Eval/treatment? Yes  To be met in 3 days   Pain level   9/10  Left residual limb     Bed Mobility  Using rails and head of bed elevated:       Rolling: Supervision     Supine to sit: Supervision     Sit to supine: Not assessed     Scooting: Supervision     Rolling: Independent    Supine to sit: Independent    Sit to supine: Independent    Scooting: Independent     Transfers Sit to stand: Minimal assist of 2 first stance, supervision with wheeled walker 2nd stance   Sit to stand: Modified Independent non weight bearing Left lower extremity    Ambulation     2 x 20 feet using  wheeled walker with Minimal progressing to supervision   for safety, Patient with  ability to maintain NWB (non-weight bearing) left lower extremity, and cues for sequencing    30 feet using  wheeled walker with Modified Independent   non weight bearing Left lower extremity     Stair negotiation: ascended and descended   Not assessed          ROM Within functional limits  left lower leg amputation  Increase range of motion 10% of affected joints    Strength BUE:  refer to OT eval  RLE:  4+/5  LLE:  hip 3+/5/5  Increase strength in affected mm groups by 1/3 grade   Balance Sitting EOB:  good    Dynamic Standing:  fair wheeled walker non weight bearing Left lower extremity   Sitting EOB:  good    Dynamic Standing: good -non weight bearing Left lower extremity      Patient is Alert & Oriented x person, place, time, and situation and follows directions    Sensation:  Patient  reports phantom pain  left lower extremity   residual limb  Edema:  yes left lower extremity   Endurance: fair       Vitals: room air   Blood Pressure at rest 132/78 Blood Pressure during session    Heart Rate at rest 77 Heart Rate during session     SPO2

## 2025-02-27 NOTE — PROGRESS NOTES
Spiritual Health History and Assessment/Progress Note  [facility]    Spiritual/Emotional Needs,  ,  ,      Name: Praveen Courtney MRN: [id number]    Age: 44 y.o.     Sex: male   Language: English   Catholic: Congregation   <principal problem not specified>     Date: 2/27/2025                           Spiritual Assessment began in [facility] [place] [facility]        Referral/Consult From: Rounding   Encounter Overview/Reason: Spiritual/Emotional Needs  Service Provided For: Patient    Emelia, Belief, Meaning:   Patient has beliefs or practices that help with coping during difficult times  Family/Friends No family/friends present      Importance and Influence:  Patient has spiritual/personal beliefs that influence decisions regarding their health  Family/Friends No family/friends present    Community:  Patient feels well-supported. Support system includes: Extended family  Family/Friends No family/friends present    Assessment and Plan of Care:     Patient Interventions include: Facilitated expression of thoughts and feelings, Engaged in theological reflection, and Facilitated life review and/ or legacy  Family/Friends Interventions include: No family/friends present    Patient Plan of Care: Spiritual Care available upon further referral  Family/Friends Plan of Care: No family/friends present    Electronically signed by Chaplain Sera on 2/27/2025 at 2:44 PM

## 2025-03-06 DIAGNOSIS — S72.352A CLOSED DISPLACED COMMINUTED FRACTURE OF SHAFT OF LEFT FEMUR, INITIAL ENCOUNTER (HCC): Primary | ICD-10-CM

## 2025-03-17 ENCOUNTER — OFFICE VISIT (OUTPATIENT)
Dept: ORTHOPEDIC SURGERY | Age: 45
End: 2025-03-17

## 2025-03-17 VITALS — HEIGHT: 68 IN | BODY MASS INDEX: 22.73 KG/M2 | WEIGHT: 150 LBS

## 2025-03-17 DIAGNOSIS — S72.352A CLOSED DISPLACED COMMINUTED FRACTURE OF SHAFT OF LEFT FEMUR, INITIAL ENCOUNTER: Primary | ICD-10-CM

## 2025-03-17 PROCEDURE — 99024 POSTOP FOLLOW-UP VISIT: CPT | Performed by: ORTHOPAEDIC SURGERY

## 2025-03-17 NOTE — PROGRESS NOTES
Mr. Courtney returns today for follow-up of a left femur fracture which was treated with ORIF.  Date of surgery was 2/26/25.  he reports he is doing okay. He does have BKA on the left and is in a wheelchair. He does complain of nerve pain in the leg.    Physical Exam:   - Skin intact with incisions         Nontender to palpation at the area of the fracture site.         ROM   wnl         The incision is healing well without evidence of infection.         Pulses are intact and symmetric bilaterally         Strength wnl         Sensation wnl    Xrays:Well aligned hardware and stable fracture    Radiographic findings reviewed with patient    Impression:   Encounter Diagnosis   Name Primary?    Closed displaced comminuted fracture of shaft of left femur, initial encounter ([facility]) Yes         Plan:   Staples removed today  Follow up 6 weeks  Okay to get incision wet, do not submerge

## 2025-04-25 DIAGNOSIS — S72.352A CLOSED DISPLACED COMMINUTED FRACTURE OF SHAFT OF LEFT FEMUR, INITIAL ENCOUNTER (HCC): Primary | ICD-10-CM

## (undated) DEVICE — ELECTRODE PT RET AD L9FT HI MOIST COND ADH HYDRGEL CORDED

## (undated) DEVICE — YANKAUER,BULB TIP,W/O VENT,RIGID,STERILE: Brand: MEDLINE

## (undated) DEVICE — 3M™ STERI-DRAPE™ U-DRAPE 1015: Brand: STERI-DRAPE™

## (undated) DEVICE — SHEET,DRAPE,70X100,STERILE: Brand: MEDLINE

## (undated) DEVICE — STRYKER

## (undated) DEVICE — SURGICAL PROCEDURE PACK ORTH IV ECLIPSE STRL

## (undated) DEVICE — TOWEL,OR,DSP,ST,BLUE,STD,6/PK,12PK/CS: Brand: MEDLINE

## (undated) DEVICE — GLOVE ORANGE PI 8   MSG9080

## (undated) DEVICE — COUNTERBORE DRILL, MANUAL: Brand: T2

## (undated) DEVICE — 4-PORT MANIFOLD: Brand: NEPTUNE 2

## (undated) DEVICE — SPONGE LAP W18XL18IN WHT COT 4 PLY FLD STRUNG RADPQ DISP ST 2 PER PACK

## (undated) DEVICE — DRAPE C ARM W41XL65IN UNIV W/ CLP AND RUBBERBAND

## (undated) DEVICE — GAUZE,SPONGE,4"X4",16PLY,STRL,LF,10/TRAY: Brand: MEDLINE

## (undated) DEVICE — BANDAGE COMPR M W6INXL10YD WHT BGE VELC E MTRX HK AND LOOP

## (undated) DEVICE — APPLICATOR MEDICATED 26 CC SOLUTION HI LT ORNG CHLORAPREP

## (undated) DEVICE — GUIDE WIRE, BALL-TIPPED, STERILE

## (undated) DEVICE — SPONGE,LAP,12"X12",XR,ST,5/PK,40PK/CS: Brand: MEDLINE

## (undated) DEVICE — GLOVE SURG SZ 7 L12IN FNGR THK79MIL GRN LTX FREE

## (undated) DEVICE — LOCKING DRILL

## (undated) DEVICE — DRESSING HYDROFIBER AQUACEL AG ADVANTAGE 3.5X6 IN

## (undated) DEVICE — SOLUTION WND IRRIGATION 450 ML 0.5 PVP-I 0.9 NACL

## (undated) DEVICE — LOCKING SCALPEL: Brand: T2 ALPHA

## (undated) DEVICE — GOWN,SIRUS,POLYRNF,RAGLAN,XL,ST,30/CS: Brand: MEDLINE

## (undated) DEVICE — GLOVE ORTHO 8   MSG9480

## (undated) DEVICE — Device

## (undated) DEVICE — BLADE,STAINLESS-STEEL,10,STRL,DISPOSABLE: Brand: MEDLINE

## (undated) DEVICE — COVER,LIGHT HANDLE,FLX,1/PK: Brand: MEDLINE INDUSTRIES, INC.

## (undated) DEVICE — DRESSING GZ W1XL8IN COT XRFRM N ADH OVERWRAP CURAD

## (undated) DEVICE — BASIC DOUBLE BASIN 2-LF: Brand: MEDLINE INDUSTRIES, INC.

## (undated) DEVICE — SYRINGE IRRIG 60ML SFT PLIABLE BLB EZ TO GRP 1 HND USE W/

## (undated) DEVICE — PAD,ABDOMINAL,5"X9",ST,LF,25/BX: Brand: MEDLINE INDUSTRIES, INC.

## (undated) DEVICE — PADDING,UNDERCAST,COTTON, 4"X4YD STERILE: Brand: MEDLINE

## (undated) DEVICE — WIPES SKIN CLOTH READYPREP 9 X 10.5 IN 2% CHLORHEX GLUCONATE CHG PREOP

## (undated) DEVICE — NDL CNTR 40CT FM MAG: Brand: MEDLINE INDUSTRIES, INC.

## (undated) DEVICE — BANDAGE COMPR FOAM 5 YDX6 IN TAN STRL COFLX

## (undated) DEVICE — 3M™ IOBAN™ 2 ANTIMICROBIAL INCISE DRAPE 6651EZ: Brand: IOBAN™ 2

## (undated) DEVICE — 3M™ IOBAN™ 2 ANTIMICROBIAL INCISE DRAPE 6650EZ: Brand: IOBAN™ 2

## (undated) DEVICE — MARKER,SKIN,WI/RULER AND LABELS: Brand: MEDLINE

## (undated) DEVICE — 450 ML BOTTLE OF 0.05% CHLORHEXIDINE GLUCONATE IN 99.95% STERILE WATER FOR IRRIGATION, USP AND APPLICATOR.: Brand: IRRISEPT ANTIMICROBIAL WOUND LAVAGE